# Patient Record
Sex: FEMALE | Race: WHITE | NOT HISPANIC OR LATINO | Employment: OTHER | URBAN - METROPOLITAN AREA
[De-identification: names, ages, dates, MRNs, and addresses within clinical notes are randomized per-mention and may not be internally consistent; named-entity substitution may affect disease eponyms.]

---

## 2023-10-13 PROBLEM — I10 HYPERTENSION: Status: ACTIVE | Noted: 2023-10-13

## 2023-10-13 PROBLEM — M17.11 PRIMARY OSTEOARTHRITIS OF RIGHT KNEE: Status: ACTIVE | Noted: 2023-10-13

## 2023-10-13 PROBLEM — R43.0 ANOSMIA: Status: ACTIVE | Noted: 2023-10-13

## 2023-10-13 PROBLEM — E78.5 HYPERLIPIDEMIA: Status: ACTIVE | Noted: 2023-10-13

## 2023-10-13 PROBLEM — E55.9 VITAMIN D DEFICIENCY: Status: ACTIVE | Noted: 2023-10-13

## 2023-10-13 PROBLEM — M54.42 ACUTE LEFT-SIDED LOW BACK PAIN WITH LEFT-SIDED SCIATICA: Status: ACTIVE | Noted: 2023-10-13

## 2023-10-13 PROBLEM — N95.1 MENOPAUSAL SYMPTOM: Status: ACTIVE | Noted: 2023-10-13

## 2023-10-13 PROBLEM — G57.02 PIRIFORMIS SYNDROME OF LEFT SIDE: Status: ACTIVE | Noted: 2023-10-13

## 2023-10-13 PROBLEM — M19.079 PRIMARY OSTEOARTHRITIS OF TALONAVICULAR JOINT: Status: ACTIVE | Noted: 2023-10-13

## 2023-10-13 RX ORDER — FLUTICASONE FUROATE 27.5 UG/1
2 SPRAY, METERED NASAL
COMMUNITY
Start: 2023-04-26 | End: 2023-10-26 | Stop reason: ALTCHOICE

## 2023-10-13 RX ORDER — CETIRIZINE HYDROCHLORIDE 10 MG/1
10 TABLET ORAL DAILY
COMMUNITY
Start: 2022-06-01 | End: 2023-10-26 | Stop reason: ALTCHOICE

## 2023-10-13 RX ORDER — ATORVASTATIN CALCIUM 10 MG/1
10 TABLET, FILM COATED ORAL DAILY
COMMUNITY
End: 2024-02-11

## 2023-10-13 RX ORDER — ACETAMINOPHEN 500 MG
100 TABLET ORAL DAILY
COMMUNITY
Start: 2020-06-04

## 2023-10-13 RX ORDER — HYDROCHLOROTHIAZIDE 12.5 MG/1
12.5 TABLET ORAL DAILY
COMMUNITY
End: 2024-02-11

## 2023-10-13 RX ORDER — AZELASTINE 1 MG/ML
1 SPRAY, METERED NASAL 2 TIMES DAILY
COMMUNITY
Start: 2023-03-13 | End: 2023-10-26 | Stop reason: ALTCHOICE

## 2023-10-13 RX ORDER — AMLODIPINE BESYLATE 5 MG/1
5 TABLET ORAL DAILY
COMMUNITY
End: 2024-02-11

## 2023-10-16 ENCOUNTER — EVALUATION (OUTPATIENT)
Dept: PHYSICAL THERAPY | Facility: CLINIC | Age: 78
End: 2023-10-16
Payer: MEDICARE

## 2023-10-16 DIAGNOSIS — M54.42 LUMBAGO WITH SCIATICA, LEFT SIDE: ICD-10-CM

## 2023-10-16 DIAGNOSIS — G57.02 LESION OF SCIATIC NERVE, LEFT LOWER LIMB: ICD-10-CM

## 2023-10-16 DIAGNOSIS — G57.02 PIRIFORMIS SYNDROME OF LEFT SIDE: Primary | ICD-10-CM

## 2023-10-16 PROCEDURE — 97110 THERAPEUTIC EXERCISES: CPT | Mod: GP

## 2023-10-16 PROCEDURE — 97162 PT EVAL MOD COMPLEX 30 MIN: CPT | Mod: GP

## 2023-10-16 NOTE — Clinical Note
October 16, 2023     Patient: Giovanna Flores   YOB: 1945   Date of Visit: 10/16/2023       To Whom It May Concern:    It is my medical opinion that Giovanna Flores {Work release (duty restriction):29774}.    If you have any questions or concerns, please don't hesitate to call.         Sincerely,        Alex Macias, PT    CC: No Recipients

## 2023-10-16 NOTE — Clinical Note
October 16, 2023     Patient: Giovanna Flores   YOB: 1945   Date of Visit: 10/16/2023       To Whom it May Concern:    Giovanna Flores was seen in my clinic on 10/16/2023. She {Return to school/sport:10539}.    If you have any questions or concerns, please don't hesitate to call.         Sincerely,          Alex Macias, PT        CC: No Recipients

## 2023-10-16 NOTE — PROGRESS NOTES
Physical Therapy Evaluation    Patient Name: Giovanna Flores  MRN: [id number]  Evaluation Date: 10/16/2023  Time Calculation  Start Time: 0900  Stop Time: 0950  Time Calculation (min): 50 min      Subjective   General:  Patient is a 79 yo female with 2 month history of LBP with left LE pain/paresthesia.  Patient with chronic history of LBP; however, recent increase in activity with resulting increase in symptoms.  Patient continues to exercise- decreased biking agressiveness with modest improvement.  Patient recently completed steroids with no improvement.  Patient is motivated to participate.         Pain:  6-10 LBP/left LE paresthesia to ankle  Some paresthesia right LE foot       Past Medical History  Hyperlipidemia  Hypertension  LBP with left sciatica  Rght knee OA  Hiatal hernia    Prior Function Per Pt/Caregiver Report:  Active female - exercise regularly         OBJECTIVE:  Objective   Posture:  Sway back         Palpation:  Modest tenderness LPS left > right  Special Tests:  ++ DELLA bilateral       Transfers:  Good body mechanics     Other:  Pelvic alignment WFL       Lumbar AROM  Flexion WFL  Extension 25% limited by pain left  Bilateral rotation limited 25% by pain left  Bilateral SB limitted 25% by pain left     Hip AROM  DELLA tightness left > right    Specific Lower Extremity MMT  Hip flexion left 4  Hip flexion right 4-  Hip ABDuction  4- bilateral  Quads 4- bilateral  HS 4 bilateral  DF/PF 4- bilateral        Outcome Measures:    Oswestry=23    Assessment       Pt is a 78 y.o. female who presents with impairments of pain, decressded ROM, weakness. These impairments have led to functional limitations including decreased exercise/ADL participation. Pt would benefit from skilled physical therapy intervention to improve above impairments and facilitate return to function.    Plan  1-2 x/week for 10 visits  Exercise  Manual/DN  US  Pelvic traction       OP EDUCATION:  Patient educated on avoiding standing  on exercise bike    Today's Treatment:  HEP to be completed daily, exercises include:  Hip abductor stretch  Piriformis stretch  clamshells    Goals:  STG  Patient to be independent with HEP    LTG-  Patient to walk for 30 minutes without need to sit  2.   Patient to exercise without pain  3.   Patient to improve Oswestry to <25% impaired

## 2023-10-19 ENCOUNTER — APPOINTMENT (OUTPATIENT)
Dept: PHYSICAL THERAPY | Facility: CLINIC | Age: 78
End: 2023-10-19
Payer: MEDICARE

## 2023-10-25 ENCOUNTER — TREATMENT (OUTPATIENT)
Dept: PHYSICAL THERAPY | Facility: CLINIC | Age: 78
End: 2023-10-25
Payer: MEDICARE

## 2023-10-25 DIAGNOSIS — G57.02 PIRIFORMIS SYNDROME OF LEFT SIDE: Primary | ICD-10-CM

## 2023-10-25 PROCEDURE — 97110 THERAPEUTIC EXERCISES: CPT | Mod: GP

## 2023-10-25 NOTE — PROGRESS NOTES
"Physical Therapy Treatment    Patient Name: Giovanna Flores  MRN: [id number]  Encounter date:  10/25/2023  Time Calculation  Start Time: 0800  Stop Time: 0845  Time Calculation (min): 45 min    Visit Number:  2 / 10   Visit Authorized:  MN    Current Problem  1. Piriformis syndrome of left side  Follow Up In Physical Therapy          Precautions  None    Pain  7/10 going down LLE    Subjective  General  Pt has been consistent with HEP, gets some temporary relief but overall pain intensity remains high.     Objective  Tighter L>R hamstrings, piriformis      Treatment:  Therex:  NuStep L1 x 5'  Supine:  90/90 hamstring stretch 3x30\" ea  Sciatic nerve glides x20  Figure 4 piriformis stretch push/pull 3x30\"ea    With red ball:  DKTC x15 5\"  LTR x10  DL bridge 10    TrA brace x10 3\" hold  + iso hip adduction x10  + march x10    Billed Treatment Times:  Therapeutic Exercise 42 min      Assessment:  Pt's response to treatment:  Today's session focused on therex to address flexibility and stability deficits. Pt with some relief in pain following, required minimal cueing for proper exercise technique.      Pain end of session:  2/10    Plan:  Continue with current POC/no changes    Assessment of current progress against goals:  Insufficient treatment time to assess progress      Goals:  STG  Patient to be independent with HEP    LTG-  Patient to walk for 30 minutes without need to sit  2.   Patient to exercise without pain  3.   Patient to improve Oswestry to <25% impaired              "

## 2023-10-26 ENCOUNTER — OFFICE VISIT (OUTPATIENT)
Dept: PRIMARY CARE | Facility: CLINIC | Age: 78
End: 2023-10-26
Payer: MEDICARE

## 2023-10-26 ENCOUNTER — LAB (OUTPATIENT)
Dept: LAB | Facility: LAB | Age: 78
End: 2023-10-26
Payer: MEDICARE

## 2023-10-26 VITALS
SYSTOLIC BLOOD PRESSURE: 126 MMHG | WEIGHT: 134 LBS | BODY MASS INDEX: 22.88 KG/M2 | DIASTOLIC BLOOD PRESSURE: 64 MMHG | HEART RATE: 81 BPM | OXYGEN SATURATION: 98 % | HEIGHT: 64 IN

## 2023-10-26 DIAGNOSIS — R22.1 NECK MASS: ICD-10-CM

## 2023-10-26 DIAGNOSIS — R22.1 NECK MASS: Primary | ICD-10-CM

## 2023-10-26 LAB
ALBUMIN SERPL-MCNC: 4.5 G/DL (ref 3.5–5)
ALP BLD-CCNC: 102 U/L (ref 35–125)
ALT SERPL-CCNC: 15 U/L (ref 5–40)
ANION GAP SERPL CALC-SCNC: 14 MMOL/L
AST SERPL-CCNC: 23 U/L (ref 5–40)
BASOPHILS # BLD AUTO: 0.06 X10*3/UL (ref 0–0.1)
BASOPHILS NFR BLD AUTO: 0.9 %
BILIRUB SERPL-MCNC: 0.4 MG/DL (ref 0.1–1.2)
BUN SERPL-MCNC: 22 MG/DL (ref 8–25)
CALCIUM SERPL-MCNC: 9.5 MG/DL (ref 8.5–10.4)
CHLORIDE SERPL-SCNC: 103 MMOL/L (ref 97–107)
CO2 SERPL-SCNC: 27 MMOL/L (ref 24–31)
CREAT SERPL-MCNC: 0.8 MG/DL (ref 0.4–1.6)
EOSINOPHIL # BLD AUTO: 0.22 X10*3/UL (ref 0–0.4)
EOSINOPHIL NFR BLD AUTO: 3.5 %
ERYTHROCYTE [DISTWIDTH] IN BLOOD BY AUTOMATED COUNT: 13.3 % (ref 11.5–14.5)
GFR SERPL CREATININE-BSD FRML MDRD: 76 ML/MIN/1.73M*2
GLUCOSE SERPL-MCNC: 102 MG/DL (ref 65–99)
HCT VFR BLD AUTO: 41.4 % (ref 36–46)
HGB BLD-MCNC: 12.9 G/DL (ref 12–16)
IMM GRANULOCYTES # BLD AUTO: 0.01 X10*3/UL (ref 0–0.5)
IMM GRANULOCYTES NFR BLD AUTO: 0.2 % (ref 0–0.9)
LYMPHOCYTES # BLD AUTO: 1.64 X10*3/UL (ref 0.8–3)
LYMPHOCYTES NFR BLD AUTO: 25.8 %
MCH RBC QN AUTO: 28.4 PG (ref 26–34)
MCHC RBC AUTO-ENTMCNC: 31.2 G/DL (ref 32–36)
MCV RBC AUTO: 91 FL (ref 80–100)
MONOCYTES # BLD AUTO: 0.6 X10*3/UL (ref 0.05–0.8)
MONOCYTES NFR BLD AUTO: 9.4 %
NEUTROPHILS # BLD AUTO: 3.83 X10*3/UL (ref 1.6–5.5)
NEUTROPHILS NFR BLD AUTO: 60.2 %
NRBC BLD-RTO: 0 /100 WBCS (ref 0–0)
PLATELET # BLD AUTO: 456 X10*3/UL (ref 150–450)
PMV BLD AUTO: 10.9 FL (ref 7.5–11.5)
POTASSIUM SERPL-SCNC: 4.2 MMOL/L (ref 3.4–5.1)
PROT SERPL-MCNC: 6.5 G/DL (ref 5.9–7.9)
RBC # BLD AUTO: 4.55 X10*6/UL (ref 4–5.2)
SODIUM SERPL-SCNC: 144 MMOL/L (ref 133–145)
WBC # BLD AUTO: 6.4 X10*3/UL (ref 4.4–11.3)

## 2023-10-26 PROCEDURE — 3074F SYST BP LT 130 MM HG: CPT

## 2023-10-26 PROCEDURE — 99213 OFFICE O/P EST LOW 20 MIN: CPT

## 2023-10-26 PROCEDURE — 36415 COLL VENOUS BLD VENIPUNCTURE: CPT

## 2023-10-26 PROCEDURE — 1159F MED LIST DOCD IN RCRD: CPT

## 2023-10-26 PROCEDURE — 85025 COMPLETE CBC W/AUTO DIFF WBC: CPT

## 2023-10-26 PROCEDURE — 1126F AMNT PAIN NOTED NONE PRSNT: CPT

## 2023-10-26 PROCEDURE — 80053 COMPREHEN METABOLIC PANEL: CPT

## 2023-10-26 PROCEDURE — 3078F DIAST BP <80 MM HG: CPT

## 2023-10-26 PROCEDURE — 1036F TOBACCO NON-USER: CPT

## 2023-10-26 ASSESSMENT — ENCOUNTER SYMPTOMS
FATIGUE: 0
CHILLS: 0
FEVER: 0
UNEXPECTED WEIGHT CHANGE: 0
CHOKING: 0

## 2023-10-26 ASSESSMENT — PATIENT HEALTH QUESTIONNAIRE - PHQ9
SUM OF ALL RESPONSES TO PHQ9 QUESTIONS 1 AND 2: 0
2. FEELING DOWN, DEPRESSED OR HOPELESS: NOT AT ALL
1. LITTLE INTEREST OR PLEASURE IN DOING THINGS: NOT AT ALL

## 2023-10-26 ASSESSMENT — PAIN SCALES - GENERAL: PAINLEVEL: 0-NO PAIN

## 2023-10-26 NOTE — PROGRESS NOTES
"Subjective   Patient ID: Giovanna Flores is a 78 y.o. female who presents for Mass (Pt c/o lump on neck, found by her dentist).    HPI   Neck lump x 3 days, noticed by dentist.   Patient states just had Covid and flu shot one week before neck lump and thinks may be enlarged LN. Has never felt lump before, patient states feel fine. Located right lateral side. Endorses father had lymphoma. Denies fever, chills, night sweats, dysphagia.     Review of Systems   Constitutional:  Negative for chills, fatigue, fever and unexpected weight change.   Respiratory:  Negative for choking.        Objective   /64   Pulse 81   Ht 1.626 m (5' 4\")   Wt 60.8 kg (134 lb)   SpO2 98%   BMI 23.00 kg/m²     Physical Exam  Constitutional:       Appearance: Normal appearance.   Neck:        Comments: 3.5 by 1.5 oblong lateral right neck mass, firm and unmovable to palpation, non-tender.   Cardiovascular:      Rate and Rhythm: Normal rate.   Pulmonary:      Breath sounds: Normal breath sounds.         Assessment/Plan   Diagnoses and all orders for this visit:  Neck mass  -     US neck; Future  -     CBC and Auto Differential; Future  -     Comprehensive metabolic panel; Future         "

## 2023-10-27 ENCOUNTER — TELEPHONE (OUTPATIENT)
Dept: PRIMARY CARE | Facility: CLINIC | Age: 78
End: 2023-10-27

## 2023-10-27 ENCOUNTER — HOSPITAL ENCOUNTER (OUTPATIENT)
Dept: RADIOLOGY | Facility: HOSPITAL | Age: 78
Discharge: HOME | End: 2023-10-27
Payer: MEDICARE

## 2023-10-27 ENCOUNTER — TREATMENT (OUTPATIENT)
Dept: PHYSICAL THERAPY | Facility: CLINIC | Age: 78
End: 2023-10-27
Payer: MEDICARE

## 2023-10-27 DIAGNOSIS — R22.1 NECK MASS: ICD-10-CM

## 2023-10-27 DIAGNOSIS — G57.02 PIRIFORMIS SYNDROME OF LEFT SIDE: ICD-10-CM

## 2023-10-27 DIAGNOSIS — R22.1 NECK MASS: Primary | ICD-10-CM

## 2023-10-27 PROCEDURE — 97140 MANUAL THERAPY 1/> REGIONS: CPT | Mod: GP

## 2023-10-27 PROCEDURE — 97110 THERAPEUTIC EXERCISES: CPT | Mod: GP

## 2023-10-27 PROCEDURE — 76536 US EXAM OF HEAD AND NECK: CPT

## 2023-10-27 NOTE — PROGRESS NOTES
"Physical Therapy Treatment    Patient Name: Giovanna Flores  MRN: [id number]  Encounter date:  10/27/2023  Time Calculation  Start Time: 0755  Stop Time: 0840  Time Calculation (min): 45 min    Visit Number:  3 / 10   Visit Authorized:  MN    Current Problem  1. Piriformis syndrome of left side  Follow Up In Physical Therapy            Precautions  None    Pain  9/10    Subjective  General  Pt had about an hour of relief after last visit however symptoms have come back to high levels. Pain goes down the lateral aspect of LLE.     Objective  TTP L PSIS      Treatment:  Therex:  NuStep L1 x 5'  Supine:  90/90 hamstring stretch 3x30\" ea  Figure 4 piriformis stretch push/pull 3x30\"ea  DL bridge x10 with march - no pain initially but pain increased towards end of set  Bridge with adduction x10 - mild discomfort  SKTC 10x10\"    Manual techniques:  STM over L lumbar paraspinals, around L PSIS, L proximal gltues    Billed Treatment Times:  Therapeutic Exercise 30 min and Manual Therapy  15 min      Assessment:  Pt with less pain following exercise despite temporary increase in symptoms with bridging. STM used to improve lasting symptom relief. Pain down at exit.      Pain end of session:  4/10    Plan:  Continue with current POC/no changes    Assessment of current progress against goals:  Insufficient treatment time to assess progress      Goals:  STG  Patient to be independent with HEP    LTG-  Patient to walk for 30 minutes without need to sit  2.   Patient to exercise without pain  3.   Patient to improve Oswestry to <25% impaired            "

## 2023-10-27 NOTE — TELEPHONE ENCOUNTER
US of neck was read as negative, but patient is palpable mass so would like her to still see ENT. Also will discuss patient's case in more detail with Dr. Villa next week. Already discussed before US, and she would like patient to see ENT specialist as well.

## 2023-10-30 ENCOUNTER — TREATMENT (OUTPATIENT)
Dept: PHYSICAL THERAPY | Facility: CLINIC | Age: 78
End: 2023-10-30
Payer: MEDICARE

## 2023-10-30 DIAGNOSIS — G57.02 PIRIFORMIS SYNDROME OF LEFT SIDE: ICD-10-CM

## 2023-10-30 PROCEDURE — 97140 MANUAL THERAPY 1/> REGIONS: CPT | Mod: GP

## 2023-10-30 PROCEDURE — 97110 THERAPEUTIC EXERCISES: CPT | Mod: GP

## 2023-10-30 NOTE — PROGRESS NOTES
"Physical Therapy Treatment    Patient Name: Giovanna Flores  MRN: [id number]  Encounter date:  10/30/2023  Time Calculation  Start Time: 0845  Stop Time: 0930  Time Calculation (min): 45 min    Visit Number:  4 / 10   Visit Authorized:  MN    Current Problem  1. Piriformis syndrome of left side  Follow Up In Physical Therapy                 Pain  Up to 5     Subjective  General  Patient reports variable level of left hip/LE pain and paresthesia over weekend.  Patient reports fairly heavy activity over weekend.        Objective  Modes tenderness left buttock/piriformis    Treatment:  There-EX  Sci Fit L3 x 7 min  Supine:  90/90 hamstring stretch 3x30\" ea- with assist  Figure 4 piriformis stretch push/pull 3x30\"ea  DL bridge x10 with march - no pain initially but pain increased towards end of set-  DNP  Bridge with adduction x10 - mild discomfort- DNP  SKTC 10x10\"  Hooklying ABD GTB x 20  Ball flexion x 20  Bal bridge 2 x 10  Manual techniques:  STM over L lumbar paraspinals, around L PSIS, L proximal glutes- sidelying right      Billed Treatment Times:  Therapeutic Exercise 30 min  Manual 10 min    Activity tolerance:  Good    OP Education  Continue HEP    Assessment:  Patient making good progress with management of piriformis syndrome through stretching.     Pt's response to treatment:  good  Areas of improvements:  flexibility  Limitations/deficits:  pain    Pain end of session:  5    Plan:     Continue with current POC with the following changes advance core stability as able.    Assessment of current progress against goals:  Progressing toward functional goals           Goals:  STG  Patient to be independent with HEP     LTG-  Patient to walk for 30 minutes without need to sit  2.   Patient to exercise without pain  3.   Patient to improve Oswestry to <25% impaired               "

## 2023-11-01 ENCOUNTER — TELEPHONE (OUTPATIENT)
Dept: PRIMARY CARE | Facility: CLINIC | Age: 78
End: 2023-11-01

## 2023-11-01 ENCOUNTER — TREATMENT (OUTPATIENT)
Dept: PHYSICAL THERAPY | Facility: CLINIC | Age: 78
End: 2023-11-01
Payer: MEDICARE

## 2023-11-01 DIAGNOSIS — G57.02 PIRIFORMIS SYNDROME OF LEFT SIDE: ICD-10-CM

## 2023-11-01 DIAGNOSIS — R22.1 NECK MASS: Primary | ICD-10-CM

## 2023-11-01 PROCEDURE — 97140 MANUAL THERAPY 1/> REGIONS: CPT | Mod: GP

## 2023-11-01 PROCEDURE — 97110 THERAPEUTIC EXERCISES: CPT | Mod: GP

## 2023-11-01 NOTE — TELEPHONE ENCOUNTER
Discussed case in further detail with Dr. Villa, depending on when appointment with Zora Mercado (ENT) is may order CT of neck. If appointment is in the next few weeks can wait, but if appt is more than a month out will order additional imaging.

## 2023-11-01 NOTE — PROGRESS NOTES
"Physical Therapy Treatment    Patient Name: Giovanna Flores  MRN: [id number]  Encounter date:  11/1/2023  Time Calculation  Start Time: 0814  Stop Time: 0910  Time Calculation (min): 56 min    Visit Number:  5 / 10   Visit Authorized:  MN    Current Problem  1. Piriformis syndrome of left side  Follow Up In Physical Therapy          Pain  3/10    Subjective  General  Pt had a day of relief after last visit but symptoms returned at night. Pain is down this morning. She feels her overall pain levels are decreasing, and manual interventions have been beneficial.    Objective  Mild core weakness evident in quadruped    Treatment:  There-EX  NuStep L1 x 5'  Supine:  90/90 hamstring stretch 2x30\" ea  Figure 4 piriformis stretch push/pull 2x30\"ea  Quadruped:  Hip ext x10 ea  Hip abd x10 ea  Shoulder extension x10 ea    Seated on tori disc:  March in place x15  LAQ x15  Chest press green ball x15  OH press green ball x15    Standing:  Pallof press x10 ea 10# band  Stir the pot x10 ea cw, ccw 10# band  Pallof walkout x10 ea 10# band    Manual techniques:  STM over L lumbar paraspinals, around L PSIS, L proximal glutes- prone      Billed Treatment Times:  Therapeutic Exercise 43 min and Manual Therapy  10 min      Assessment:  Pt's response to treatment:  Exercise progressed in mat level, seated, and standing. Pt felt some discomfort in quadruped but otherwise no pain. Moderate cueing required for form and core engagement with standing exercise.   Areas of improvements:  good pain relief from manual, no increase in symptoms with new exercise  Limitations/deficits:  pain continues to return 1-2 days post treatment    Pain end of session:  0/10    Plan:    Continue with current POC with the following changes follow up on new exercises and issue for HEP if appropriate.    Assessment of current progress against goals:  Progressing toward functional goals        Goals:  STG  Patient to be independent with HEP     LTG-  Patient to " walk for 30 minutes without need to sit  2.   Patient to exercise without pain  3.   Patient to improve Oswestry to <25% impaired

## 2023-11-06 ENCOUNTER — TREATMENT (OUTPATIENT)
Dept: PHYSICAL THERAPY | Facility: CLINIC | Age: 78
End: 2023-11-06
Payer: MEDICARE

## 2023-11-06 DIAGNOSIS — G57.02 PIRIFORMIS SYNDROME OF LEFT SIDE: ICD-10-CM

## 2023-11-06 PROCEDURE — 97110 THERAPEUTIC EXERCISES: CPT | Mod: GP

## 2023-11-06 PROCEDURE — 97140 MANUAL THERAPY 1/> REGIONS: CPT | Mod: GP

## 2023-11-06 NOTE — PROGRESS NOTES
"Physical Therapy Treatment    Patient Name: Giovanna Flores  MRN: [id number]  Encounter date:  11/6/2023  Time Calculation  Start Time: 0800  Stop Time: 0845  Time Calculation (min): 45 min    Visit Number:  6 / 10   Visit Authorized:  10    Current Problem  1. Piriformis syndrome of left side  Follow Up In Physical Therapy            Pain  Up to 3       Subjective  General  Patient reports marked improvement with in left hip pain- some left LE (distal) paresthesia remains.  Patient dilignent with HEP.           Treatment:  There-Ex:    NuStep L1 x 5'  Supine:  90/90 hamstring stretch 2x30\" ea  Figure 4 piriformis stretch push/pull 2x30\"ea    Quadruped:  Hip ext x 5 ea  Hip abd x 5 ea- pain  Shoulder extension x10 ea     Seated on ball:issued to HEP/recommend sit on pillows if ball unstable  March in place x 15  Overhead press 5# x 15  LAQ x 15    Standing:  Pallof press x10 ea 10# band  Stir the pot x10 ea cw, ccw 10# band  Pallof walkout x10 ea 10# band- DNP     Manual:  STM over L lumbar paraspinals, around L PSIS, L proximal glutes- prone     Billed Treatment Times:  Therapeutic Exercise 30 min  Manual 10    Activity tolerance:  good       Assessment:     Pt's response to treatment:  good  Areas of improvements:  core strength  Limitations/deficits:  left LE paresthesia    Pain end of session:  2-3    Plan:     Continue with current POC with the following changes advance as able    Assessment of current progress against goals:  Progressing toward functional goals    OP Education:       Goals:  STG  Patient to be independent with HEP     LTG-  Patient to walk for 30 minutes without need to sit  2.   Patient to exercise without pain  3.   Patient to improve Oswestry to <25% impaired                  "

## 2023-11-08 ENCOUNTER — TREATMENT (OUTPATIENT)
Dept: PHYSICAL THERAPY | Facility: CLINIC | Age: 78
End: 2023-11-08
Payer: MEDICARE

## 2023-11-08 DIAGNOSIS — G57.02 PIRIFORMIS SYNDROME OF LEFT SIDE: ICD-10-CM

## 2023-11-08 PROCEDURE — 97110 THERAPEUTIC EXERCISES: CPT | Mod: GP

## 2023-11-08 PROCEDURE — 97140 MANUAL THERAPY 1/> REGIONS: CPT | Mod: GP

## 2023-11-08 NOTE — PROGRESS NOTES
"Physical Therapy Treatment    Patient Name: Giovanna Flores  MRN: [id number]  Encounter date:  11/8/2023  Time Calculation  Start Time: 0815  Stop Time: 0855  Time Calculation (min): 40 min    Visit Number:  7/ 10   Visit Authorized:  10    Current Problem  1. Piriformis syndrome of left side  Follow Up In Physical Therapy              Pain  1-2/10    Subjective  General  Pt still gets symptoms in the mornings however the intensity is greatly reduced. Pain lessens with gentle mobility. Exercise/manual at PT have been beneficial.    Obective:  Mild core weakness in quadruped    Treatment:  Ther-Ex:  NuStep L1 x 5'  Supine:  PPT x10 3\" hold  90/90 heel tap x10 - mod cues for form    Quadruped:  Hip ext x 10 - mild discomfort when stabilizing on LLE  Shoulder extension x10 ea    Standing:  3-way hip x10 orange band above knee - nerve discomfort when kicking LLE so performed only standing on LLE   Band sidestep x10 R/L orange above knees  Mini squat x10 - cues for form      Manual:  STM over L lumbar paraspinals, around L PSIS, L proximal glutes- prone     Billed Treatment Times:  Therapeutic Exercise 30 min and Manual Therapy  10 min      Assessment:  Pt's response to treatment:  Exercise progressed as above. Pt had intermittent symptoms down LLE but this did not linger. Pt required moderate cues for form with more advanced exercises.   Areas of improvements:  reduced pain levels throughout the day  Limitations/deficits:  intermittent nerve pain with exercise    Pain end of session:  2-3    Plan:  Continue with current POC/no changes    Assessment of current progress against goals:  Progressing toward functional goals      Goals:  STG  Patient to be independent with HEP     LTG-  Patient to walk for 30 minutes without need to sit  2.   Patient to exercise without pain  3.   Patient to improve Oswestry to <25% impaired                  "

## 2023-11-10 NOTE — PROGRESS NOTES
"Physical Therapy Treatment    Patient Name: Giovanna Flores  MRN: [id number]  Encounter date:  11/13/2023  Time Calculation  Start Time: 0800  Stop Time: 0845  Time Calculation (min): 45 min    Visit Number:  7 / 10   Visit Authorized:  10    Current Problem  1. Piriformis syndrome of left side  Follow Up In Physical Therapy           Pain  2-3     Subjective  General  Patient reports increased walking without recourse.     Objective  Excellent body mechanics supine to sit    Treatment:    NuStep L2 x 7'  Supine:  90/90 hamstring stretch 2x30\" ea  Figure 4 piriformis stretch push/pull 2x30\"ea     Quadruped:  Hip ext x 5 ea  Hip abd x 5 ea- pain  Shoulder extension x10 ea     Seated on blue ball: issued to HEP/recommend sit on pillows if ball unstable  March in place x 15  Overhead press 5# x 15  LAQ x 15    Standing:  Pallof press x10 ea 10# band  Stir the pot x10 ea cw, ccw 10# band  Pallof walkout x10 ea 10# band- DNP     Manual:  STM over L lumbar paraspinals, around L PSIS, L proximal glutes- prone           Billed Treatment Times:  Therapeutic Exercise 30 min  Manual 10 min    Activity tolerance:  good      Assessment:     Pt's response to treatment:  good  Areas of improvements:  function  Limitations/deficits:  hip flexibility    Pain end of session:  2    Plan:     Continue with current POC with the following changes advancel as tolerated    Assessment of current progress against goals:  Progressing toward functional goals    OP Education:  Monitor body mechanics       Goals:  STG  Patient to be independent with HEP     LTG-  Patient to walk for 30 minutes without need to sit  2.   Patient to exercise without pain  3.   Patient to improve Oswestry to <25% impaired               STG  Patient to be independent with HEP     LTG-  Patient to walk for 30 minutes without need to sit  2.   Patient to exercise without pain  3.   Patient to improve Oswestry to <25% impaired                  "

## 2023-11-13 ENCOUNTER — TREATMENT (OUTPATIENT)
Dept: PHYSICAL THERAPY | Facility: CLINIC | Age: 78
End: 2023-11-13
Payer: MEDICARE

## 2023-11-13 DIAGNOSIS — G57.02 PIRIFORMIS SYNDROME OF LEFT SIDE: ICD-10-CM

## 2023-11-13 PROCEDURE — 97110 THERAPEUTIC EXERCISES: CPT | Mod: GP

## 2023-11-13 PROCEDURE — 97140 MANUAL THERAPY 1/> REGIONS: CPT | Mod: GP

## 2023-11-17 ENCOUNTER — ANCILLARY PROCEDURE (OUTPATIENT)
Dept: RADIOLOGY | Facility: CLINIC | Age: 78
End: 2023-11-17
Payer: MEDICARE

## 2023-11-17 ENCOUNTER — TELEPHONE (OUTPATIENT)
Dept: PRIMARY CARE | Facility: CLINIC | Age: 78
End: 2023-11-17
Payer: MEDICARE

## 2023-11-17 DIAGNOSIS — E04.9 THYROID GOITER: Primary | ICD-10-CM

## 2023-11-17 DIAGNOSIS — E04.1 NODULE OF LEFT LOBE OF THYROID GLAND: ICD-10-CM

## 2023-11-17 DIAGNOSIS — R22.1 NECK MASS: Primary | ICD-10-CM

## 2023-11-17 PROCEDURE — 70491 CT SOFT TISSUE NECK W/DYE: CPT

## 2023-11-17 PROCEDURE — 2550000001 HC RX 255 CONTRASTS

## 2023-11-17 RX ADMIN — IOHEXOL 50 ML: 350 INJECTION, SOLUTION INTRAVENOUS at 09:09

## 2023-11-20 NOTE — TELEPHONE ENCOUNTER
Overall reading of CT was nonspecific, but radiologist recommended referral to ENT twice. She had a component of asymmetric mucosal thickening in the back of her nose that is recommended to see ENT. Also has thyroid goiter with nodule on left lobe of thyroid.       Sent Dr. Blakely CT results to see if patient needed earlier appointment, Dr. Blakely said is go to wait until January appointment but asked for additional US order of thyroid to be done before appt. Order placed.

## 2023-11-29 ENCOUNTER — TREATMENT (OUTPATIENT)
Dept: PHYSICAL THERAPY | Facility: CLINIC | Age: 78
End: 2023-11-29
Payer: MEDICARE

## 2023-11-29 DIAGNOSIS — G57.02 PIRIFORMIS SYNDROME OF LEFT SIDE: ICD-10-CM

## 2023-11-29 PROCEDURE — 97140 MANUAL THERAPY 1/> REGIONS: CPT | Mod: GP

## 2023-11-29 PROCEDURE — 97110 THERAPEUTIC EXERCISES: CPT | Mod: GP

## 2023-11-29 NOTE — PROGRESS NOTES
"Physical Therapy Treatment    Patient Name: Giovanna Flores  MRN: [id number]  Encounter date:  11/29/2023  Time Calculation  Start Time: 0815  Stop Time: 0900  Time Calculation (min): 45 min    Visit Number:  8 / 10   Visit Authorized:  10    Current Problem  1. Piriformis syndrome of left side  Follow Up In Physical Therapy        Pain  5/10    Subjective  General  Pt reports somewhat higher pain last week as she was on her feet cooking for the holiday. She also reports a fall that occurred when tripping over a rug. She has had some shoulder soreness since but has not seen her doctor - symptoms are gradually getting better.     Objective  TTP around L PSIS    Treatment:  NuStep L2 x 6'  Supine:  90/90 hamstring stretch 2x30\" ea  Figure 4 piriformis stretch push/pull 2x30\"ea  SKTC 2x30\" Ea       Seated on dynadisc:  March in place 2x15  Overhead press purple ball 2x10  LAQ  2x10    Standing:  MoFlex calf stretch 3x30\"       Manual:  STM over L lumbar paraspinals, around L PSIS, L proximal glutes- prone           Billed Treatment Times:  Therapeutic Exercise 30 min  Manual 10 min      Assessment:  Pt's response to treatment:  Pt with less soreness and discomfort following manual techniques and light exercise. Pt not limited by shoulder soreness and was advised to seek medical attention if symptoms continue or worsen.   Areas of improvements:  good pain response within session  Limitations/deficits: continued pain with activity at home    Pain end of session:  1/10    Plan:  Continue with current POC/no changes    Assessment of current progress against goals:  Progressing toward functional goals      Goals:  STG  Patient to be independent with HEP     LTG-  Patient to walk for 30 minutes without need to sit  2.   Patient to exercise without pain  3.   Patient to improve Oswestry to <25% impaired           "

## 2023-12-01 ENCOUNTER — APPOINTMENT (OUTPATIENT)
Dept: PHYSICAL THERAPY | Facility: CLINIC | Age: 78
End: 2023-12-01
Payer: MEDICARE

## 2023-12-04 ENCOUNTER — ANCILLARY PROCEDURE (OUTPATIENT)
Dept: RADIOLOGY | Facility: CLINIC | Age: 78
End: 2023-12-04
Payer: MEDICARE

## 2023-12-04 VITALS — WEIGHT: 140 LBS | BODY MASS INDEX: 23.9 KG/M2 | HEIGHT: 64 IN

## 2023-12-04 DIAGNOSIS — R92.8 ABNORMAL MAMMOGRAM OF RIGHT BREAST: Primary | ICD-10-CM

## 2023-12-04 DIAGNOSIS — Z12.31 ENCOUNTER FOR SCREENING MAMMOGRAM FOR MALIGNANT NEOPLASM OF BREAST: ICD-10-CM

## 2023-12-04 DIAGNOSIS — E04.1 NODULE OF LEFT LOBE OF THYROID GLAND: ICD-10-CM

## 2023-12-04 DIAGNOSIS — E04.9 THYROID GOITER: ICD-10-CM

## 2023-12-04 PROCEDURE — 77067 SCR MAMMO BI INCL CAD: CPT

## 2023-12-04 PROCEDURE — 76536 US EXAM OF HEAD AND NECK: CPT

## 2023-12-04 NOTE — PROGRESS NOTES
"Physical Therapy Treatment    Patient Name: Giovanna Flores  MRN: [id number]  Encounter date:  12/5/2023  Time Calculation  Start Time: 0810  Stop Time: 0855  Time Calculation (min): 45 min    Visit Number:  9 / 10   Visit Authorized:  10    Current Problem  1. Piriformis syndrome of left side  Follow Up In Physical Therapy              Precautions       Pain  5/10      Subjective  General  Patient reports overall decrease in intensity and frequency of left sided SI type pain in addition to decrease in intensity and frequency of left LE paresthesia.  Patient diligent with HEP         Objective  ++ tenderness left buttock        Treatment:  NuStep L2 x 8'  Supine:  90/90 hamstring stretch 2x30\" ea  Figure 4 piriformis stretch push/pull 2x30\"ea  SKTC 2x30\" Ea  Ball flexion x 20     Seated on dynadisc:  March in place 2x10  Overhead press purple ball 2x10- DNP  LAQ  2x10    Standing:  MoFlex calf stretch 3x30\"- DNP     Stir the pot x 20 ea 10#     Manual:  STM over L lumbar paraspinals, around L PSIS, L proximal glutes- prone           Billed Treatment Times:  Therapeutic Exercise 35 min and Manual Therapy  10 min    Activity tolerance:  good    Assessment:   Pt's response to treatment:  good  Areas of improvements:  radicular pain  Limitations/deficits:  AM soreness    Pain end of session:  3    Plan:     Continue with current POC with the following changes re-assess    Assessment of current progress against goals:  Progressing toward functional goals    Goals:      STG  Patient to be independent with HEP     LTG-  Patient to walk for 30 minutes without need to sit  2.   Patient to exercise without pain  3.   Patient to improve Oswestry to <25% impaired              "

## 2023-12-05 ENCOUNTER — TREATMENT (OUTPATIENT)
Dept: PHYSICAL THERAPY | Facility: CLINIC | Age: 78
End: 2023-12-05
Payer: MEDICARE

## 2023-12-05 ENCOUNTER — TELEPHONE (OUTPATIENT)
Dept: PRIMARY CARE | Facility: CLINIC | Age: 78
End: 2023-12-05

## 2023-12-05 DIAGNOSIS — G57.02 PIRIFORMIS SYNDROME OF LEFT SIDE: Primary | ICD-10-CM

## 2023-12-05 PROCEDURE — 97110 THERAPEUTIC EXERCISES: CPT | Mod: GP

## 2023-12-05 PROCEDURE — 97140 MANUAL THERAPY 1/> REGIONS: CPT | Mod: GP

## 2023-12-05 NOTE — TELEPHONE ENCOUNTER
Please let the patient know I have again updated both [facility] Mercado and Dr. Villa regarding the positive thyroid nodule on US exam. Dr. Blakely does biopsies in his office, and will discuss the biopsy at your appointment on 1/11/23.

## 2023-12-07 ENCOUNTER — HOSPITAL ENCOUNTER (OUTPATIENT)
Dept: RADIOLOGY | Facility: HOSPITAL | Age: 78
Discharge: HOME | End: 2023-12-07
Payer: MEDICARE

## 2023-12-07 DIAGNOSIS — R92.8 OTHER ABNORMAL AND INCONCLUSIVE FINDINGS ON DIAGNOSTIC IMAGING OF BREAST: ICD-10-CM

## 2023-12-07 PROCEDURE — 76642 ULTRASOUND BREAST LIMITED: CPT | Mod: RT

## 2023-12-07 PROCEDURE — 76982 USE 1ST TARGET LESION: CPT | Mod: RT

## 2023-12-08 DIAGNOSIS — N60.01 BREAST CYST, RIGHT: Primary | ICD-10-CM

## 2023-12-12 NOTE — PROGRESS NOTES
"Physical Therapy Treatment/Re-Assessment    Patient Name: Giovanna Flores  MRN: [id number]  Encounter date:  12/13/2023  Time Calculation  Start Time: 1030  Stop Time: 1115  Time Calculation (min): 45 min    Visit Number:  10 / 10   Visit Authorized:  10    Current Problem  1. Piriformis syndrome of left side  Follow Up In Physical Therapy          Precautions  none    Pain  3-10 (intense pain frequency decreasing    Subjective  General  Patient reports early AM stiffness/PM stiffness which is reduce with stretching- marked reduction in left LE radicular pain.  Patient continues to report right shoulder pain    Oswestry: 14 points/28% impaired- versus 23 at evaluation       Objective  decreased      Treatment:  NuStep L2 x 8'  Supine:  90/90 hamstring stretch 2x30\" ea  Figure 4 piriformis stretch push/pull 2x30\"ea  SKTC 2x30\" Ea  Ball flexion x 20     Seated on dynadisc:  March in place 2x10  Overhead press purple ball 2x10- DNP  LAQ  2x10    Standing:  MoFlex calf stretch 3x30\"- DNP     Stir the pot x 20 ea 10# crossover symetry cords     Manual:  STM over L lumbar paraspinals, around L PSIS, L proximal glutes- prone       Billed Treatment Times:  Therapeutic Exercise 30 min and Manual Therapy  10 min    Activity tolerance:  good    Assessment:  Patient with steady progress with above POC to reduce pain improve mobility/maximize core strength. Patient with modest delay in improvement after fall 11/23.  Patient will benefit from continued PT   Pt's response to treatment:  good  Areas of improvements:  core strength  Limitations/deficits:  LBP    Pain end of session:  3    Plan:     Continue with current POC with the following changes extend plan of care up to 10 additional visits.    Assessment of current progress against goals:  Progressing toward functional goals        Goals:       STG  Patient to be independent with HEP- MET     LTG-  Patient to walk for 30 minutes without need to sit-MET  2.   Patient to exercise " without pain- NOT MET  3.   Patient to improve Oswestry to <25% impaired- NOT MET

## 2023-12-13 ENCOUNTER — TREATMENT (OUTPATIENT)
Dept: PHYSICAL THERAPY | Facility: CLINIC | Age: 78
End: 2023-12-13
Payer: MEDICARE

## 2023-12-13 DIAGNOSIS — G57.02 PIRIFORMIS SYNDROME OF LEFT SIDE: Primary | ICD-10-CM

## 2023-12-13 PROCEDURE — 97140 MANUAL THERAPY 1/> REGIONS: CPT | Mod: GP

## 2023-12-13 PROCEDURE — 97110 THERAPEUTIC EXERCISES: CPT | Mod: GP

## 2024-01-05 NOTE — PROGRESS NOTES
"Physical Therapy Treatment    Patient Name: Giovanna Flores  MRN: [id number]  Encounter date:  1/8/2024  Time Calculation  Start Time: 0945  Stop Time: 1030  Time Calculation (min): 45 min     PT Therapeutic Procedures Time Entry  Manual Therapy Time Entry: 10  Therapeutic Exercise Time Entry: 30    Visit Number:  12 (including evaluation)  Planned total visits: 20  Visit Authorized:  20    Current Problem  1. Piriformis syndrome of left side  Follow Up In Physical Therapy            Precautions  none    Pain  4/10    Subjective  General  Patient reports was doing \"great\"; modest increase in symptoms after putting Jeff decorations away.    Objective  Good body mechancs      Treatment:  NuStep L2 x 6'  Supine:  90/90 hamstring stretch 2x30\" ea  Figure 4 piriformis stretch push/pull 2x30\"ea  SKTC 2x30\" Ea  Ball flexion x 20     Seated on dynadisc:  March in place 2x10  Overhead press purple ball 2x10- DNP  LAQ  2x10    Standing:  MoFlex calf stretch 3x30\"- DNP     Stir the pot x 20 ea 10# crossover symetry cords     Manual:  STM over L lumbar paraspinals, around L PSIS, L proximal glutes- prone             Billed Treatment Times:  Therapeutic Exercise 30 min and Manual Therapy  10 min    Activity tolerance:  good    OP EDUCATION:  Observe lifting technique    Assessment:     Pt's response to treatment:  good  Areas of improvements:  core  Limitations/deficits:  pain    Pain end of session:  2    Plan:     Continue with current POC with the following changes advance as ablw    Assessment of current progress against goals:  Progressing toward functional goals    Goals:    STG  Patient to be independent with HEP- MET     LTG-  Patient to walk for 30 minutes without need to sit-MET  2.   Patient to exercise without pain- NOT MET  3.   Patient to improve Oswestry to <25% impaired- NOT MET                 "

## 2024-01-08 ENCOUNTER — TREATMENT (OUTPATIENT)
Dept: PHYSICAL THERAPY | Facility: CLINIC | Age: 79
End: 2024-01-08
Payer: MEDICARE

## 2024-01-08 DIAGNOSIS — G57.02 PIRIFORMIS SYNDROME OF LEFT SIDE: Primary | ICD-10-CM

## 2024-01-08 PROCEDURE — 97110 THERAPEUTIC EXERCISES: CPT | Mod: GP

## 2024-01-08 PROCEDURE — 97140 MANUAL THERAPY 1/> REGIONS: CPT | Mod: GP

## 2024-01-11 ENCOUNTER — OFFICE VISIT (OUTPATIENT)
Dept: OTOLARYNGOLOGY | Facility: CLINIC | Age: 79
End: 2024-01-11
Payer: MEDICARE

## 2024-01-11 VITALS — BODY MASS INDEX: 22.2 KG/M2 | TEMPERATURE: 96.9 F | WEIGHT: 130 LBS | HEIGHT: 64 IN

## 2024-01-11 DIAGNOSIS — E04.2 NONTOXIC MULTINODULAR GOITER: ICD-10-CM

## 2024-01-11 DIAGNOSIS — E04.1 THYROID NODULE: Primary | ICD-10-CM

## 2024-01-11 DIAGNOSIS — J39.2 MASS OF NASOPHARYNX: ICD-10-CM

## 2024-01-11 PROCEDURE — 1036F TOBACCO NON-USER: CPT | Performed by: OTOLARYNGOLOGY

## 2024-01-11 PROCEDURE — 1126F AMNT PAIN NOTED NONE PRSNT: CPT | Performed by: OTOLARYNGOLOGY

## 2024-01-11 PROCEDURE — 99214 OFFICE O/P EST MOD 30 MIN: CPT | Performed by: OTOLARYNGOLOGY

## 2024-01-11 PROCEDURE — 1159F MED LIST DOCD IN RCRD: CPT | Performed by: OTOLARYNGOLOGY

## 2024-01-11 NOTE — PROGRESS NOTES
Chief Complaint   Patient presents with    Follow-up     F/U U/S THYROID     HPI:  Giovanna Flores is a 79 y.o. female she was recently at the dentist who palpated a lump on the right side of her neck.  Ultimately underwent CT scan of the neck which showed no neck mass.  However it did show a dominant left-sided thyroid nodule.  Had ultrasound performed which did show a dominant left-sided thyroid nodule.  No prior thyroid history.  No sore throat, hoarseness, dysphagia.  CT also shows some asymmetry in the right fossa of Rosenmuller.    US THYROID; 12/4/2023      INDICATION:  Signs/Symptoms:thyroid goiter with nodule on left lobe of thyroid as  seen on CT of neck.   COMPARISON:  CT soft tissue neck 11/17/2023  FINDINGS:    Thyroid gland is heterogeneous in echogenicity.      Right lobe of the thyroid: 4.3 cm x 1.2 cm x 1.8 cm cm. 3 nodules:  0.8 x0.8 x 0.5 cm.   0.8 x 0.9 x 0.8 cm.   1.2 x1.2 x 0.9 cm is seen at the inferior pole thyroid.      Left lobe of the thyroid: 4.1 cm x 1.7 cm x 1.9 cm cm. A dominant  solid nodule can be seen within the left thyroid lobe measuring 2.3 x  1.7 x 1.8 cm. This has hypervascular color Doppler flow noted.      Thyroid isthmus: 0.2 cm cm. Unremarkable.      IMPRESSION:  TI-RADS 4-moderately suspicious. Ultrasound-guided fine-needle  aspiration of the dominant left thyroid nodule is suggested.      CT:  IMPRESSION:  1. Component of asymmetric mucosal thickening in the region of the  right fossa of Rosenmuller in the nasopharynx with underlying  postinflammatory change or even mucosal lesion not excluded with  direct observation by ENT recommended.      2. No discrete lesion deep to the level of the skin marker in the  right posterolateral neck.      3. Nodular goiter with a nodule in the left lobe of the thyroid  measuring up to 2.1 cm. Consider follow-up ultrasound.  PMH:  Past Medical History:   Diagnosis Date    Hypertension      History reviewed. No pertinent surgical history.   "    Medications:     Current Outpatient Medications:     amLODIPine (Norvasc) 5 mg tablet, Take 1 tablet (5 mg) by mouth once daily., Disp: , Rfl:     cholecalciferol (Vitamin D-3) 50 mcg (2,000 unit) capsule, Take 2 capsules (100 mcg) by mouth early in the morning.., Disp: , Rfl:     glucosam/chond-msm1/C/lina/bor (GLUCOSAMINE-CHOND-MSM COMPLEX ORAL), Take by mouth., Disp: , Rfl:     hydroCHLOROthiazide (HYDRODiuril) 12.5 mg tablet, Take 1 tablet (12.5 mg) by mouth once daily., Disp: , Rfl:     atorvastatin (Lipitor) 10 mg tablet, Take 1 tablet (10 mg) by mouth once daily., Disp: , Rfl:      Allergies:  Allergies   Allergen Reactions    Erythromycin Unknown    Penicillins Unknown    Azithromycin Palpitations        ROS:  Review of systems normal unless stated otherwise in the HPI and/or PMH.    Physical Exam:  Temperature 36.1 °C (96.9 °F), height 1.626 m (5' 4\"), weight 59 kg (130 lb). Body mass index is 22.31 kg/m².     GENERAL APPEARANCE: Well developed and well nourished.  Alert and oriented in no acute distress.  Normal vocal quality.      HEAD/FACE: No erythema or edema or facial tenderness.  Normal facial nerve function bilaterally.    EAR:       EXTERNAL: Normal pinnas and external auditory canals without lesion or obstructing wax.       MIDDLE EAR: Tympanic membranes intact and mobile with normal landmarks.  Middle ear space appears well aerated.       TUBE STATUS: N/A       MASTOID CAVITY: N/A       HEARING: Gross hearing assessment is within normal limits.      NOSE:       VISUALIZED USING: Anterior rhinoscopy with headlight and nasal speculum.       DORSUM: Midline, nontraumatic appearance.       MUCOSA: Normal-appearing.       SECRETIONS: Normal.       SEPTUM: Midline and nonobstructing.       INFERIOR TURBINATES: Normal.       MIDDLE TURBINATES/MEATUS: N/A       BLEEDING: N/A         ORAL CAVITY/PHARYNX:       TEETH: Adequate dentition.       TONGUE: No mass or lesion.  Normal mobility.       FLOOR OF " MOUTH: No mass or lesion.       PALATE: Normal hard palate, soft palate, and uvula.       OROPHARYNX: Normal without mass or lesion.       BUCCAL MUCOSA/GBS: Normal without mass or lesion.       LIPS: Normal.    LARYNX/HYPOPHARYNX/NASOPHARYNX: N/A    NECK: No palpable masses or abnormal adenopathy.  Trachea is midline.  Prominent right carotid bulb.    THYROID: Palpable nodule with swallowing left side.    SALIVARY GLANDS: Normal bilateral parotid and submandibular glands by inspection and palpation.    TMJ's: Normal.    NEURO: Cranial nerve exam grossly normal bilaterally.       Assessment/Plan   Giovanna was seen today for follow-up.  Diagnoses and all orders for this visit:  Thyroid nodule (Primary)  Nontoxic multinodular goiter  Mass of nasopharynx     Educated the dominant left thyroid nodule carries about a 10% risk of malignancy therefore should we should further arrange for ultrasound-guided fine-needle aspiration biopsy in my office.  Provide education about this including the procedure and the risks such as but limited to bleeding and infection.  At that time we will perform nasal endoscopy.  Follow up for For needle biopsy and nasal endoscopy.     Alberto Blakely MD

## 2024-01-12 ENCOUNTER — APPOINTMENT (OUTPATIENT)
Dept: PHYSICAL THERAPY | Facility: CLINIC | Age: 79
End: 2024-01-12
Payer: MEDICARE

## 2024-01-17 ENCOUNTER — APPOINTMENT (OUTPATIENT)
Dept: PHYSICAL THERAPY | Facility: CLINIC | Age: 79
End: 2024-01-17
Payer: MEDICARE

## 2024-01-18 NOTE — PROGRESS NOTES
"Physical Therapy Treatment    Patient Name: Giovanna Flores  MRN: [id number]  Encounter date:  1/19/2024             Visit Number:  Visit count could not be calculated. Make sure you are using a visit which is associated with an episode. (including evaluation)  Planned total visits: ***  Visit Authorized:  ***    Current Problem  No diagnosis found.      Precautions       Pain       Subjective  General        ***    Objective  ***      Treatment:    There-Ex::  NuStep L2 x 6'  Supine:  90/90 hamstring stretch 2x30\" ea  Figure 4 piriformis stretch push/pull 2x30\"ea  SKTC 2x30\" Ea  Ball flexion x 20     Seated on dynadisc:  March in place 2x10  Overhead press purple ball 2x10- DNP  LAQ  2x10    Standing:  MoFlex calf stretch 3x30\"- DNP     Stir the pot x 20 ea 10# crossover symetry cords       Manual:  STM over L lumbar paraspinals, around L PSIS, L proximal glutes- prone          Activity tolerance:  {Activity:49591}    OP EDUCATION:       Assessment:     Pt's response to treatment:  ***  Areas of improvements:  ***  Limitations/deficits:  ***    Pain end of session:  ***    Plan:     {BASPLAN:52383}    Assessment of current progress against goals:  {BASPTNOTEGOALASSESSMENT:66581}    Goals:       STG  Patient to be independent with HEP- MET     LTG-  Patient to walk for 30 minutes without need to sit-MET  2.   Patient to exercise without pain- NOT MET  3.   Patient to improve Oswestry to <25% impaired- NOT MET             "

## 2024-01-19 ENCOUNTER — APPOINTMENT (OUTPATIENT)
Dept: PHYSICAL THERAPY | Facility: CLINIC | Age: 79
End: 2024-01-19
Payer: MEDICARE

## 2024-01-23 ENCOUNTER — APPOINTMENT (OUTPATIENT)
Dept: PHYSICAL THERAPY | Facility: CLINIC | Age: 79
End: 2024-01-23
Payer: MEDICARE

## 2024-01-24 NOTE — PROGRESS NOTES
"Physical Therapy Treatment    Patient Name: Giovanna Flores  MRN: [id number]  Encounter date:  1/25/2024  Time Calculation  Start Time: 0920  Stop Time: 1005  Time Calculation (min): 45 min     PT Therapeutic Procedures Time Entry  Manual Therapy Time Entry: 10  Therapeutic Exercise Time Entry: 30    Visit Number:  13 (including evaluation)  Planned total visits: 20  Visit Authorized:  20    Current Problem  1. Piriformis syndrome of left side  Follow Up In Physical Therapy          Precautions  none    Pain  2/10  Left sided LPS  Greatest in morning    Subjective  General  Patient in good spirits; compliant with HEP.   Patient with chief complaint of left sided LBP- greatest in AM       Objective  Less tender to palpate    Treatment:      Ther-Ex  NuStep L2 x 7'  Supine:  90/90 hamstring stretch 2x30\" ea  Figure 4 piriformis stretch push/pull 2x30\"ea  SKTC 2x30\" Ea  Ball flexion x 20     Seated on dynadisc:  March in place 2x10  Overhead press purple ball 2x10- DNP  LAQ  2x10    Standing:  MoFlex calf stretch 2x30\"     Stir the pot x 20 ea 10# crossover symetry cords     Manual:  STM over L lumbar paraspinals, around L PSIS, L proximal glutes- prone         Activity tolerance:  good    OP EDUCATION:  Continue with HEP    Assessment:     Pt's response to treatment:  good  Areas of improvements:  flexibility  Limitations/deficits:  pain    Pain end of session:    2-3    Plan:     Continue with current POC with the following changes advance as able    Assessment of current progress against goals:  Progressing toward functional goals    Goals:      STG  Patient to be independent with HEP- MET     LTG-  Patient to walk for 30 minutes without need to sit-MET  2.   Patient to exercise without pain- NOT MET  3.   Patient to improve Oswestry to <25% impaired- NOT MET                       "

## 2024-01-25 ENCOUNTER — TREATMENT (OUTPATIENT)
Dept: PHYSICAL THERAPY | Facility: CLINIC | Age: 79
End: 2024-01-25
Payer: MEDICARE

## 2024-01-25 DIAGNOSIS — G57.02 PIRIFORMIS SYNDROME OF LEFT SIDE: Primary | ICD-10-CM

## 2024-01-25 PROCEDURE — 97140 MANUAL THERAPY 1/> REGIONS: CPT | Mod: GP

## 2024-01-25 PROCEDURE — 97110 THERAPEUTIC EXERCISES: CPT | Mod: GP

## 2024-01-29 NOTE — PROGRESS NOTES
"Physical Therapy Treatment    Patient Name: Giovanna Flores  MRN: [id number]  Encounter date:  1/30/2024  Time Calculation  Start Time: 0755  Stop Time: 0840  Time Calculation (min): 45 min     PT Therapeutic Procedures Time Entry  Manual Therapy Time Entry: 10  Therapeutic Exercise Time Entry: 30    Visit Number:  14 (including evaluation)  Planned total visits: 20  Visit Authorized:  20    Current Problem  1. Piriformis syndrome of left side  Follow Up In Physical Therapy              Precautions  none    Pain  Up to 8 in AM  Presenlty 0/10 after stretching    Subjective  General  Patient reports continued AM pain left LB/buttock; dramatic reduction with stretching in AM         Objective  Modest spasm left LPS/piriformis    Treatment:       Ther-Ex  NuStep L2 x 7'  Supine:  90/90 hamstring stretch 2x30\" ea  Figure 4 piriformis stretch push/pull 2x30\"ea  SKTC 2x30\" Ea  Ball flexion x 20     Seated on dynadisc:  March in place 2x10 with UE movements  Overhead press purple ball 2x10- DNP  LAQ  2x10    Standing:  MoFlex calf stretch 2x30\"     Stir the pot x 20 ea 10# crossover symetry cords     Manual:  STM over L lumbar paraspinals, around L PSIS, L proximal glutes- prone     Activity tolerance:  good    OP EDUCATION:  Continue with HEP    Assessment:     Pt's response to treatment:  good  Areas of improvements:  left sided LBP  Limitations/deficits:  right shoulder pain    Pain end of session:    0/10    Plan:  Continue with POC; advance core strengthening as able    Assessment of current progress against goals:  Progressing toward functional goals    Goals:      STG  Patient to be independent with HEP- MET     LTG-  Patient to walk for 30 minutes without need to sit-MET  2.   Patient to exercise without pain- NOT MET  3.   Patient to improve Oswestry to <25% impaired- NOT MET          "

## 2024-01-30 ENCOUNTER — TREATMENT (OUTPATIENT)
Dept: PHYSICAL THERAPY | Facility: CLINIC | Age: 79
End: 2024-01-30
Payer: MEDICARE

## 2024-01-30 DIAGNOSIS — G57.02 PIRIFORMIS SYNDROME OF LEFT SIDE: Primary | ICD-10-CM

## 2024-01-30 PROCEDURE — 97110 THERAPEUTIC EXERCISES: CPT | Mod: GP

## 2024-01-30 PROCEDURE — 97140 MANUAL THERAPY 1/> REGIONS: CPT | Mod: GP

## 2024-01-31 NOTE — PROGRESS NOTES
"Physical Therapy Treatment    Patient Name: Giovanna Flores  MRN: [id number]  Encounter date:  2/1/2024  Time Calculation  Start Time: 0915  Stop Time: 1000  Time Calculation (min): 45 min     PT Therapeutic Procedures Time Entry  Manual Therapy Time Entry: 10  Therapeutic Exercise Time Entry: 30    Visit Number:  15 (including evaluation)  Planned total visits: 20  Visit Authorized/Insurance Considerations:  20    Current Problem  1. Piriformis syndrome of left side  Follow Up In Physical Therapy              Precautions   none    Pain  3/10    Subjective  General  Patient reports \"mornings are always toughest\".  Patient with ml of left sided LBP as before.    Objective  Improving body mechanics    Treatment:    Ther-Ex  NuStep L2 x 7'  Supine:  90/90 hamstring stretch 2x30\" ea  Figure 4 piriformis stretch push/pull 2x30\"ea  SKTC 2x30\" Ea  Ball flexion x 20     Seated on dynadisc:  March in place 2x10 with UE movements  Overhead press purple ball 2x10- DNP  LAQ  2x10- with UE movements    Standing:  MoFlex calf stretch 2x30\"  Satnding hip ABD 2 x 10     Stir the pot x 20 ea 10# crossover symetry cords     Manual:  STM over L lumbar paraspinals, around L PSIS, L proximal glutes- prone       Activity tolerance:  good    OP EDUCATION:  Continue with HEP    Assessment:     Pt's response to treatment:  good  Areas of improvements:  core stability  Limitations/deficits:  AM pain and stiffness    Pain end of session:    2-3/10    Plan:     Continue with current POC with the following changes advance as able    Assessment of current progress against goals:  Progressing toward functional goals         Goals:       STG  Patient to be independent with HEP- MET     LTG-20 visits  Patient to walk for 30 minutes without need to sit-MET  2.   Patient to exercise without pain- NOT MET  3.   Patient to improve Oswestry to <25% impaired- NOT MET                "

## 2024-02-01 ENCOUNTER — TREATMENT (OUTPATIENT)
Dept: PHYSICAL THERAPY | Facility: CLINIC | Age: 79
End: 2024-02-01
Payer: MEDICARE

## 2024-02-01 DIAGNOSIS — G57.02 PIRIFORMIS SYNDROME OF LEFT SIDE: Primary | ICD-10-CM

## 2024-02-01 PROCEDURE — 97140 MANUAL THERAPY 1/> REGIONS: CPT | Mod: GP

## 2024-02-01 PROCEDURE — 97110 THERAPEUTIC EXERCISES: CPT | Mod: GP

## 2024-02-07 ENCOUNTER — PROCEDURE VISIT (OUTPATIENT)
Dept: OTOLARYNGOLOGY | Facility: CLINIC | Age: 79
End: 2024-02-07
Payer: MEDICARE

## 2024-02-07 DIAGNOSIS — E04.1 THYROID NODULE: Primary | ICD-10-CM

## 2024-02-07 PROCEDURE — 10005 FNA BX W/US GDN 1ST LES: CPT | Performed by: OTOLARYNGOLOGY

## 2024-02-07 PROCEDURE — 88112 CYTOPATH CELL ENHANCE TECH: CPT | Performed by: PATHOLOGY

## 2024-02-07 PROCEDURE — 88112 CYTOPATH CELL ENHANCE TECH: CPT

## 2024-02-07 NOTE — PROGRESS NOTES
Diagnosis: Left thyroid nodule(s)  Procedure: Ultrasound guided fine-needle aspiration biopsy  Local anesthesia  Specimens sent to pathology in CytoLyt and Afirma solution  No bleeding  No complication  No implants  Findings: Dominant left-sided thyroid nodule.  Real-time ultrasound today was 1.9 x 2.2 x 1.7 cm.  On the right-hand side there is a smaller heterogeneous isoechoic nodule measuring 1.2 cm.  There are also 2 subcentimeter nodules.  Biopsy was taken today only of the left-sided thyroid nodule.  The patient is aware of the procedure including the postoperative care and the risks, such as but not limited to, bleeding, infection, failure to get adequate specimen.  The patient does wish to proceed.  Description: After informed consent and time out the patient was placed on the table in a supine manner.  A shoulder roll was placed.  Diagnostic ultrasound was performed cataloging any nodule(s).  After this was done I anesthetized the skin with 1% Xylocaine with epinephrine.  Following this under direct ultrasound visualization and guidance a 25-gauge needle was visibly entered into the left thyroid nodule and gentle suction was applied obtaining a sample.  This was placed in fixative.  2 samples were placed in CytoLyt and 2 samples were placed in Afirma solution.  Postoperative ultrasound failed to show any hematoma or complication.

## 2024-02-09 ENCOUNTER — APPOINTMENT (OUTPATIENT)
Dept: PHYSICAL THERAPY | Facility: CLINIC | Age: 79
End: 2024-02-09
Payer: MEDICARE

## 2024-02-10 DIAGNOSIS — E78.2 MIXED HYPERLIPIDEMIA: ICD-10-CM

## 2024-02-10 DIAGNOSIS — I10 PRIMARY HYPERTENSION: Primary | ICD-10-CM

## 2024-02-11 RX ORDER — AMLODIPINE BESYLATE 5 MG/1
5 TABLET ORAL DAILY
Qty: 90 TABLET | Refills: 0 | Status: SHIPPED | OUTPATIENT
Start: 2024-02-11 | End: 2024-05-10

## 2024-02-11 RX ORDER — ATORVASTATIN CALCIUM 10 MG/1
10 TABLET, FILM COATED ORAL DAILY
Qty: 90 TABLET | Refills: 0 | Status: SHIPPED | OUTPATIENT
Start: 2024-02-11 | End: 2024-05-10

## 2024-02-11 RX ORDER — HYDROCHLOROTHIAZIDE 12.5 MG/1
12.5 TABLET ORAL
Qty: 90 TABLET | Refills: 0 | Status: SHIPPED | OUTPATIENT
Start: 2024-02-11 | End: 2024-05-10

## 2024-02-12 LAB
LABORATORY COMMENT REPORT: NORMAL
LABORATORY COMMENT REPORT: NORMAL
PATH REPORT.FINAL DX SPEC: NORMAL
PATH REPORT.GROSS SPEC: NORMAL
PATH REPORT.RELEVANT HX SPEC: NORMAL
PATH REPORT.TOTAL CANCER: NORMAL

## 2024-02-14 ENCOUNTER — TREATMENT (OUTPATIENT)
Dept: PHYSICAL THERAPY | Facility: CLINIC | Age: 79
End: 2024-02-14
Payer: MEDICARE

## 2024-02-14 DIAGNOSIS — G57.02 PIRIFORMIS SYNDROME OF LEFT SIDE: ICD-10-CM

## 2024-02-14 PROCEDURE — 97140 MANUAL THERAPY 1/> REGIONS: CPT | Mod: GP

## 2024-02-14 PROCEDURE — 97110 THERAPEUTIC EXERCISES: CPT | Mod: GP

## 2024-02-14 NOTE — PROGRESS NOTES
"Physical Therapy Treatment    Patient Name: Giovanna Flores  MRN: [id number]  Encounter date:  2/14/2024  Time Calculation  Start Time: 1116  Stop Time: 1201  Time Calculation (min): 45 min     PT Therapeutic Procedures Time Entry  Manual Therapy Time Entry: 12  Therapeutic Exercise Time Entry: 33    Visit Number:  16 (including evaluation)  Planned total visits: 20  Visit Authorized/Insurance Considerations:  20    Current Problem  1. Piriformis syndrome of left side  Follow Up In Physical Therapy          Precautions   none    Pain  2/10    Subjective  General  Pt feels calf stretching may be causing knee pain after visits, would like to skip that today. Overall back symptoms continue to improve.     Objective  Tenderness L glutes, piriformis    Treatment:    Ther-Ex  NuStep L2 x 5'  Supine:  90/90 hamstring stretch 2x30\" ea  Figure 4 piriformis stretch push/pull 2x30\"ea  SKTC 2x30\" Ea  L1 clamshell x15 ea    Standing:  Band sidestep yellow loop above knee 2x10        Manual:  STM over L lumbar paraspinals, around L PSIS, L proximal glutes- prone       Activity tolerance:  good    OP EDUCATION:  Continue with HEP    Assessment:  Pt's response to treatment: Pt with mild discomfort with added band exercise but this did not last. Symptoms again improved with  STM following exercise.   Areas of improvements: Symptom response to treatment  Limitations/deficits: continued pain in mornings    Pain end of session:    1/10    Plan:     Continue with current POC with the following changes advance as able    Assessment of current progress against goals:  Progressing toward functional goals         Goals:       STG  Patient to be independent with HEP- MET     LTG-20 visits  Patient to walk for 30 minutes without need to sit-MET  2.   Patient to exercise without pain- NOT MET  3.   Patient to improve Oswestry to <25% impaired- NOT MET                "

## 2024-02-16 ENCOUNTER — APPOINTMENT (OUTPATIENT)
Dept: PHYSICAL THERAPY | Facility: CLINIC | Age: 79
End: 2024-02-16
Payer: MEDICARE

## 2024-02-19 NOTE — PROGRESS NOTES
"Physical Therapy Treatment    Patient Name: Giovanna Flores  MRN: [id number]  Encounter date:  2/20/2024  Time Calculation  Start Time: 1020  Stop Time: 1100  Time Calculation (min): 40 min     PT Therapeutic Procedures Time Entry  Manual Therapy Time Entry: 10  Therapeutic Exercise Time Entry: 30    Visit Number:  17 (including evaluation)  Planned total visits: 20  Visits Authorized/Insurance Coverage:  20    Current Problem  1. Piriformis syndrome of left side  Follow Up In Physical Therapy              Precautions  spine    Pain  Up to 5/10 upon waking  Reduces to 1/10 with movement    Subjective  General  Patient in good spirits; reports AM LBP which improves throughout day.    Objective  Modest low back tenderness left      Treatment:      Ther-Ex  NuStep L2 x 6'  Supine:  90/90 hamstring stretch 2x30\" ea  Figure 4 piriformis stretch push/pull 2x30\"ea  SKTC 2x30\" Ea  L1 clamshell x15 ea    Standing:  Band sidestep yellow loop above knee 8'x  6 - some LBP    Stir the pot 10# crossover symetry cord x 20 CW/CCW      Manual:  STM over L lumbar paraspinals, around L PSIS, L proximal glutes- prone        Current HEP:  Low back/hips stretching    Activity tolerance:  good    OP EDUCATION:  Continue with HEP    Assessment:     Pt's response to treatment:  good  Areas of improvements:  core strenght  Limitations/deficits:  AM pain    Pain end of session:   1/10    Plan:  Continue with POC; advance PREs as able      Assessment of current progress against goals:  Progressing toward functional goals    Goals:      STG  Patient to be independent with HEP- MET     LTG-20 visits  Patient to walk for 30 minutes without need to sit-MET  2.   Patient to exercise without pain- NOT MET  3.   Patient to improve Oswestry to <25% impaired- NOT MET                "

## 2024-02-20 ENCOUNTER — TREATMENT (OUTPATIENT)
Dept: PHYSICAL THERAPY | Facility: CLINIC | Age: 79
End: 2024-02-20
Payer: MEDICARE

## 2024-02-20 DIAGNOSIS — G57.02 PIRIFORMIS SYNDROME OF LEFT SIDE: Primary | ICD-10-CM

## 2024-02-20 PROCEDURE — 97140 MANUAL THERAPY 1/> REGIONS: CPT | Mod: GP

## 2024-02-20 PROCEDURE — 97110 THERAPEUTIC EXERCISES: CPT | Mod: GP

## 2024-02-26 ENCOUNTER — APPOINTMENT (OUTPATIENT)
Dept: PHYSICAL THERAPY | Facility: CLINIC | Age: 79
End: 2024-02-26
Payer: MEDICARE

## 2024-02-26 NOTE — PROGRESS NOTES
"Physical Therapy Treatment    Patient Name: Giovanna Flores  MRN: [id number]  Encounter date:  2/26/2024             Visit Number:  Visit count could not be calculated. Make sure you are using a visit which is associated with an episode. (including evaluation)  Planned total visits: 18  Visits Authorized/Insurance Coverage:  20    Current Problem  No diagnosis found.      Precautions  soinr    Pain       Subjective  General        ***    Objective    Treatment:      Ther-Ex  NuStep L2 x 6'  Supine:  90/90 hamstring stretch 2x30\" ea  Figure 4 piriformis stretch push/pull 2x30\"ea  SKTC 2x30\" Ea  L1 clamshell x15 ea    Standing:  Band sidestep yellow loop above knee 8'x  6 - some LBP     Stir the pot 10# crossover symetry cord x 20 CW/CCW       Manual:  STM over L lumbar paraspinals, around L PSIS, L proximal glutes- prone        Current HEP:  Low back/hip stretching  Core strengthening    Activity tolerance:  {Activity:78338}    OP EDUCATION:       Assessment:     Pt's response to treatment:  ***  Areas of improvements:  ***  Limitations/deficits:  ***    Pain end of session: ***    Plan:     {BASPLAN:54951}    Assessment of current progress against goals:  {BASPTNOTEGOALASSESSMENT:88358}        Goals:      STG  Patient to be independent with HEP- MET     LTG-20 visits  Patient to walk for 30 minutes without need to sit-MET  2.   Patient to exercise without pain- NOT MET  3.   Patient to improve Oswestry to <25% impaired- NOT MET                "

## 2024-02-27 ENCOUNTER — OFFICE VISIT (OUTPATIENT)
Dept: PRIMARY CARE | Facility: CLINIC | Age: 79
End: 2024-02-27
Payer: MEDICARE

## 2024-02-27 VITALS
SYSTOLIC BLOOD PRESSURE: 124 MMHG | HEIGHT: 64 IN | OXYGEN SATURATION: 98 % | HEART RATE: 86 BPM | BODY MASS INDEX: 22.91 KG/M2 | DIASTOLIC BLOOD PRESSURE: 72 MMHG | WEIGHT: 134.2 LBS

## 2024-02-27 DIAGNOSIS — M53.3 SACROILIAC JOINT PAIN: Primary | ICD-10-CM

## 2024-02-27 DIAGNOSIS — M17.11 PRIMARY OSTEOARTHRITIS OF RIGHT KNEE: ICD-10-CM

## 2024-02-27 PROCEDURE — 3074F SYST BP LT 130 MM HG: CPT | Performed by: PHYSICIAN ASSISTANT

## 2024-02-27 PROCEDURE — 20605 DRAIN/INJ JOINT/BURSA W/O US: CPT | Performed by: PHYSICIAN ASSISTANT

## 2024-02-27 PROCEDURE — 99213 OFFICE O/P EST LOW 20 MIN: CPT | Mod: 25 | Performed by: PHYSICIAN ASSISTANT

## 2024-02-27 PROCEDURE — 3078F DIAST BP <80 MM HG: CPT | Performed by: PHYSICIAN ASSISTANT

## 2024-02-27 PROCEDURE — 1036F TOBACCO NON-USER: CPT | Performed by: PHYSICIAN ASSISTANT

## 2024-02-27 PROCEDURE — 1160F RVW MEDS BY RX/DR IN RCRD: CPT | Performed by: PHYSICIAN ASSISTANT

## 2024-02-27 PROCEDURE — 99213 OFFICE O/P EST LOW 20 MIN: CPT | Performed by: PHYSICIAN ASSISTANT

## 2024-02-27 PROCEDURE — 1126F AMNT PAIN NOTED NONE PRSNT: CPT | Performed by: PHYSICIAN ASSISTANT

## 2024-02-27 PROCEDURE — 1159F MED LIST DOCD IN RCRD: CPT | Performed by: PHYSICIAN ASSISTANT

## 2024-02-27 ASSESSMENT — PATIENT HEALTH QUESTIONNAIRE - PHQ9
1. LITTLE INTEREST OR PLEASURE IN DOING THINGS: NOT AT ALL
SUM OF ALL RESPONSES TO PHQ9 QUESTIONS 1 AND 2: 0
2. FEELING DOWN, DEPRESSED OR HOPELESS: NOT AT ALL

## 2024-02-27 ASSESSMENT — ENCOUNTER SYMPTOMS
OCCASIONAL FEELINGS OF UNSTEADINESS: 0
LOSS OF SENSATION IN FEET: 0
DEPRESSION: 0

## 2024-02-27 ASSESSMENT — PAIN SCALES - GENERAL: PAINLEVEL: 0-NO PAIN

## 2024-02-27 NOTE — PROGRESS NOTES
"Subjective   Patient ID: Giovanna Flores is a 79 y.o. female who presents for cortisone shot .    Presents for for eval of R knee pain - hx of OA and prior injections. Has been doing PT for her lower back and SI joint pain and had aggravation to R knee pain. She has been having l leg numbness at times as well and continued pain to L SI joint in spite of therapy. She does exercise regularly and is very active.  Knee is improved today and has no pain.          Review of Systems   All other systems reviewed and are negative.      Objective   /72   Pulse 86   Ht 1.626 m (5' 4\")   Wt 60.9 kg (134 lb 3.2 oz)   SpO2 98%   BMI 23.04 kg/m²     Physical Exam  Constitutional:       Appearance: Normal appearance.   Musculoskeletal:      Right shoulder: No deformity or effusion. Normal range of motion. Decreased strength (4/5 infraspinatus).      Lumbar back: Tenderness present. No deformity or spasms.        Back:       Comments: L SI joint tenderness to palpation   Neurological:      Mental Status: She is alert.     L SI injection administered under sterile conditions: 2ml Lidocaine 1%, 1ml Triamcinalone 40mg injected after betadine prep and topical ethyl chloride. Pt tolerated well without complication.     Assessment/Plan   Diagnoses and all orders for this visit:  Sacroiliac joint pain  Primary osteoarthritis of right knee         "

## 2024-02-27 NOTE — PATIENT INSTRUCTIONS
Recommend follow up for knee injection prior to travel, sooner with increased pain.   MRI LS spine if symptoms continue.

## 2024-03-14 ENCOUNTER — TREATMENT (OUTPATIENT)
Dept: PHYSICAL THERAPY | Facility: CLINIC | Age: 79
End: 2024-03-14
Payer: MEDICARE

## 2024-03-14 DIAGNOSIS — M54.42 ACUTE LEFT-SIDED LOW BACK PAIN WITH LEFT-SIDED SCIATICA: ICD-10-CM

## 2024-03-14 DIAGNOSIS — G57.02 PIRIFORMIS SYNDROME OF LEFT SIDE: Primary | ICD-10-CM

## 2024-03-14 PROCEDURE — 97110 THERAPEUTIC EXERCISES: CPT | Mod: GP

## 2024-03-14 PROCEDURE — 97140 MANUAL THERAPY 1/> REGIONS: CPT | Mod: GP

## 2024-03-14 NOTE — PROGRESS NOTES
"Physical Therapy Treatment    Patient Name: Giovanna Flores  MRN: [id number]  Encounter date:  3/14/2024  Time Calculation  Start Time: 0930  Stop Time: 1015  Time Calculation (min): 45 min     PT Therapeutic Procedures Time Entry  Manual Therapy Time Entry: 10  Therapeutic Exercise Time Entry: 30    Visit Number:  18 (including evaluation)  Planned total visits: 20  Visits Authorized/Insurance Coverage:  20    Current Problem  1. Piriformis syndrome of left side        2. Acute left-sided low back pain with left-sided sciatica            Precautions  spine    Pain  Left LB/SI  3/10    Subjective  General  Patient reports s/p injection left SI 2/27.  Patient with limited changes post injection.  Patient   reports continued pain with prolonged sitting and standing.  Patient to contact Paul Childs if she feels she needs another injection.  Patient does feel symptoms have improved form onset but remain quite limiting.  Patient does report some decrease in night pain.      Objective  ++ tenderness     Treatment:    Ther-Ex  NuStep L2 x 6'  Supine:  90/90 hamstring stretch 2x30\" ea  Figure 4 piriformis stretch push/pull 2x30\"ea  SKTC 2x30\" Ea  L1 clamshell x15 ea    Standing:  Band sidestep peach loop above knee 8'x  6      Stir the pot 10# crossover symetry cord x 20 CW/CCW       Manual:  STM over L lumbar paraspinals, around L PSIS, L proximal glutes- prone         Current HEP:  Low back/hips stretching     Activity tolerance:  good    OP EDUCATION:  Continue with HEP    Assessment:  Patient with modest improvement post SI injection.  Patient appears to be at somewhat of a plateau.  Pt's response to treatment:  good  Areas of improvements:  core strength  Limitations/deficits:  pain    Pain end of session:   3/10    Plan:     Continue with current POC with the following changes advance as able/assess response to injection.    Assessment of current progress against goals:  Progressing toward functional " goals        Goals:    STG  Patient to be independent with HEP- MET     LTG-20 visits  Patient to walk for 30 minutes without need to sit-MET  2.   Patient to exercise without pain- NOT MET  3.   Patient to improve Oswestry to <25% impaired- NOT MET

## 2024-04-02 ENCOUNTER — APPOINTMENT (OUTPATIENT)
Dept: PHYSICAL THERAPY | Facility: CLINIC | Age: 79
End: 2024-04-02
Payer: MEDICARE

## 2024-04-05 ENCOUNTER — TREATMENT (OUTPATIENT)
Dept: PHYSICAL THERAPY | Facility: CLINIC | Age: 79
End: 2024-04-05
Payer: MEDICARE

## 2024-04-05 DIAGNOSIS — M54.42 ACUTE LEFT-SIDED LOW BACK PAIN WITH LEFT-SIDED SCIATICA: Primary | ICD-10-CM

## 2024-04-05 PROCEDURE — 97110 THERAPEUTIC EXERCISES: CPT | Mod: GP

## 2024-04-05 NOTE — PROGRESS NOTES
"    Physical Therapy Treatment    Patient Name: Giovanna Flores  MRN: [id number]  Encounter date:  4/5/2024  Time Calculation  Start Time: 0850  Stop Time: 0930  Time Calculation (min): 40 min     PT Therapeutic Procedures Time Entry  Manual Therapy Time Entry: 10  Therapeutic Exercise Time Entry: 25    Visit Number:  19 (including evaluation)  Planned total visits: 20  Visits Authorized/Insurance Coverage:  20    Current Problem  1. Acute left-sided low back pain with left-sided sciatica              Precautions  spine    Pain  1/10    Subjective  General  Patient verbalized preparedeness for discharge with continued HEP.  Patient feels injection has finally take hold.  Patient is back to all pre injury exercise    Objective  Improved trunk ROM- no pain  Oswestry  11 points  22% impaired    Treatment:    Ther-Ex  NuStep L2 x 6'  Supine:  90/90 hamstring stretch 2x30\" ea  Figure 4 piriformis stretch push/pull 2x30\"ea  SKTC 2x30\" Ea  L1 clamshell x15 ea    Standing:  Band sidestep peach loop above knee 8'x  6      Stir the pot 10# crossover symetry cord x 20 CW/CCW       Manual:  STM over L lumbar paraspinals, around L PSIS, L proximal glutes- prone         Current HEP:  Low back/hips stretching    Activity tolerance:  good    OP EDUCATION:  Continue with HEP    Assessment:     Pt's response to treatment:  good  Areas of improvements:  exercise tolerance /LBP  Limitations/deficits:  NA    Pain end of session:   1/10    Plan:     Discharge- continue with HEP    Assessment of current progress against goals:  Progressing toward functional goals    Goals:    STG  Patient to be independent with HEP- MET     LTG-20 visits  Patient to walk for 30 minutes without need to sit-MET  2.   Patient to exercise without pain- MET  3.   Patient to improve Oswestry to <25% impaired- MET                "

## 2024-04-26 ENCOUNTER — OFFICE VISIT (OUTPATIENT)
Dept: PRIMARY CARE | Facility: CLINIC | Age: 79
End: 2024-04-26
Payer: MEDICARE

## 2024-04-26 ENCOUNTER — HOSPITAL ENCOUNTER (OUTPATIENT)
Dept: RADIOLOGY | Facility: CLINIC | Age: 79
Discharge: HOME | End: 2024-04-26
Payer: MEDICARE

## 2024-04-26 VITALS
HEART RATE: 80 BPM | OXYGEN SATURATION: 99 % | HEIGHT: 64 IN | DIASTOLIC BLOOD PRESSURE: 68 MMHG | WEIGHT: 137 LBS | BODY MASS INDEX: 23.39 KG/M2 | SYSTOLIC BLOOD PRESSURE: 124 MMHG

## 2024-04-26 DIAGNOSIS — M53.3 SACROILIAC JOINT PAIN: Primary | ICD-10-CM

## 2024-04-26 DIAGNOSIS — M54.16 LUMBAR RADICULOPATHY: ICD-10-CM

## 2024-04-26 PROCEDURE — 72100 X-RAY EXAM L-S SPINE 2/3 VWS: CPT

## 2024-04-26 PROCEDURE — 1160F RVW MEDS BY RX/DR IN RCRD: CPT

## 2024-04-26 PROCEDURE — 99213 OFFICE O/P EST LOW 20 MIN: CPT

## 2024-04-26 PROCEDURE — 1036F TOBACCO NON-USER: CPT

## 2024-04-26 PROCEDURE — 1159F MED LIST DOCD IN RCRD: CPT

## 2024-04-26 PROCEDURE — 72100 X-RAY EXAM L-S SPINE 2/3 VWS: CPT | Performed by: RADIOLOGY

## 2024-04-26 PROCEDURE — 3074F SYST BP LT 130 MM HG: CPT

## 2024-04-26 PROCEDURE — 3078F DIAST BP <80 MM HG: CPT

## 2024-04-26 PROCEDURE — 1125F AMNT PAIN NOTED PAIN PRSNT: CPT

## 2024-04-26 RX ORDER — GABAPENTIN 100 MG/1
100 CAPSULE ORAL 2 TIMES DAILY
Qty: 60 CAPSULE | Refills: 1 | Status: SHIPPED | OUTPATIENT
Start: 2024-04-26 | End: 2024-06-25

## 2024-04-26 ASSESSMENT — PAIN SCALES - GENERAL: PAINLEVEL: 8

## 2024-04-26 ASSESSMENT — ENCOUNTER SYMPTOMS
NUMBNESS: 1
BACK PAIN: 1

## 2024-04-26 NOTE — PROGRESS NOTES
"Subjective   Patient ID: Giovanna Flores is a 79 y.o. female who presents for Back Pain (Continuing back pain /la).      Left SI joint pain. Patient had SI cortisone injection with Alberto Childs on 2/27/25, which only gave temporary taper. Also has finished 20 sessions of physical therapy, which helped some but pain continues to wax and wane. Tylenol arthritis helps some. Occasionally has left leg numbness and radiation of pain down left leg. She does exercise regularly and is very active.            Review of Systems   Musculoskeletal:  Positive for back pain.   Neurological:  Positive for numbness.       Objective   /68   Pulse 80   Ht 1.626 m (5' 4\")   Wt 62.1 kg (137 lb)   SpO2 99%   BMI 23.52 kg/m²     Physical Exam  Constitutional:       Appearance: Normal appearance.   Musculoskeletal:      Right shoulder: Normal strength.      Lumbar back: Tenderness present. No deformity or spasms.        Back:       Comments: L SI joint tenderness to palpation   Neurological:      Mental Status: She is alert.         Assessment/Plan   Diagnoses and all orders for this visit:  Sacroiliac joint pain  -     gabapentin (Neurontin) 100 mg capsule; Take 1 capsule (100 mg) by mouth 2 times a day.  Lumbar radiculopathy  -     gabapentin (Neurontin) 100 mg capsule; Take 1 capsule (100 mg) by mouth 2 times a day.  -     XR lumbar spine 2-3 views; Future         "

## 2024-04-29 ENCOUNTER — TELEPHONE (OUTPATIENT)
Dept: PRIMARY CARE | Facility: CLINIC | Age: 79
End: 2024-04-29
Payer: MEDICARE

## 2024-04-29 NOTE — TELEPHONE ENCOUNTER
Xray shows mild to moderate arthritis throughout the lumbar spine. Continue with new medication gabapentin.

## 2024-05-10 DIAGNOSIS — R53.83 FATIGUE, UNSPECIFIED TYPE: Primary | ICD-10-CM

## 2024-05-10 DIAGNOSIS — E55.9 VITAMIN D DEFICIENCY: ICD-10-CM

## 2024-05-10 DIAGNOSIS — R35.1 NOCTURIA: ICD-10-CM

## 2024-05-10 DIAGNOSIS — I10 PRIMARY HYPERTENSION: ICD-10-CM

## 2024-05-10 DIAGNOSIS — E78.2 MIXED HYPERLIPIDEMIA: ICD-10-CM

## 2024-05-10 RX ORDER — ATORVASTATIN CALCIUM 10 MG/1
10 TABLET, FILM COATED ORAL DAILY
Qty: 90 TABLET | Refills: 0 | Status: SHIPPED | OUTPATIENT
Start: 2024-05-10 | End: 2024-05-12

## 2024-05-10 RX ORDER — AMLODIPINE BESYLATE 5 MG/1
5 TABLET ORAL DAILY
Qty: 90 TABLET | Refills: 0 | Status: SHIPPED | OUTPATIENT
Start: 2024-05-10 | End: 2024-05-12

## 2024-05-10 RX ORDER — HYDROCHLOROTHIAZIDE 12.5 MG/1
12.5 TABLET ORAL
Qty: 90 TABLET | Refills: 0 | Status: SHIPPED | OUTPATIENT
Start: 2024-05-10

## 2024-05-12 RX ORDER — ATORVASTATIN CALCIUM 10 MG/1
10 TABLET, FILM COATED ORAL DAILY
Qty: 90 TABLET | Refills: 0 | Status: SHIPPED | OUTPATIENT
Start: 2024-05-12

## 2024-05-12 RX ORDER — AMLODIPINE BESYLATE 5 MG/1
5 TABLET ORAL DAILY
Qty: 90 TABLET | Refills: 0 | Status: SHIPPED | OUTPATIENT
Start: 2024-05-12

## 2024-05-14 ENCOUNTER — LAB (OUTPATIENT)
Dept: LAB | Facility: LAB | Age: 79
End: 2024-05-14
Payer: MEDICARE

## 2024-05-14 DIAGNOSIS — R53.83 FATIGUE, UNSPECIFIED TYPE: ICD-10-CM

## 2024-05-14 DIAGNOSIS — R35.1 NOCTURIA: ICD-10-CM

## 2024-05-14 DIAGNOSIS — E78.2 MIXED HYPERLIPIDEMIA: ICD-10-CM

## 2024-05-14 DIAGNOSIS — E55.9 VITAMIN D DEFICIENCY: ICD-10-CM

## 2024-05-14 LAB
25(OH)D3 SERPL-MCNC: 42 NG/ML (ref 31–100)
ALBUMIN SERPL-MCNC: 4.4 G/DL (ref 3.5–5)
ALP BLD-CCNC: 95 U/L (ref 35–125)
ALT SERPL-CCNC: 13 U/L (ref 5–40)
ANION GAP SERPL CALC-SCNC: 12 MMOL/L
APPEARANCE UR: CLEAR
AST SERPL-CCNC: 21 U/L (ref 5–40)
BASOPHILS # BLD AUTO: 0.05 X10*3/UL (ref 0–0.1)
BASOPHILS NFR BLD AUTO: 0.8 %
BILIRUB SERPL-MCNC: 0.5 MG/DL (ref 0.1–1.2)
BILIRUB UR STRIP.AUTO-MCNC: NEGATIVE MG/DL
BUN SERPL-MCNC: 20 MG/DL (ref 8–25)
CALCIUM SERPL-MCNC: 9.7 MG/DL (ref 8.5–10.4)
CHLORIDE SERPL-SCNC: 103 MMOL/L (ref 97–107)
CHOLEST SERPL-MCNC: 183 MG/DL (ref 133–200)
CHOLEST/HDLC SERPL: 2.7 {RATIO}
CO2 SERPL-SCNC: 29 MMOL/L (ref 24–31)
COLOR UR: NORMAL
CREAT SERPL-MCNC: 0.8 MG/DL (ref 0.4–1.6)
EGFRCR SERPLBLD CKD-EPI 2021: 76 ML/MIN/1.73M*2
EOSINOPHIL # BLD AUTO: 0.14 X10*3/UL (ref 0–0.4)
EOSINOPHIL NFR BLD AUTO: 2.2 %
ERYTHROCYTE [DISTWIDTH] IN BLOOD BY AUTOMATED COUNT: 13.6 % (ref 11.5–14.5)
GLUCOSE SERPL-MCNC: 97 MG/DL (ref 65–99)
GLUCOSE UR STRIP.AUTO-MCNC: NORMAL MG/DL
HCT VFR BLD AUTO: 41.3 % (ref 36–46)
HDLC SERPL-MCNC: 68 MG/DL
HGB BLD-MCNC: 12.9 G/DL (ref 12–16)
HOLD SPECIMEN: NORMAL
IMM GRANULOCYTES # BLD AUTO: 0.01 X10*3/UL (ref 0–0.5)
IMM GRANULOCYTES NFR BLD AUTO: 0.2 % (ref 0–0.9)
KETONES UR STRIP.AUTO-MCNC: NEGATIVE MG/DL
LDLC SERPL CALC-MCNC: 95 MG/DL (ref 65–130)
LEUKOCYTE ESTERASE UR QL STRIP.AUTO: NEGATIVE
LYMPHOCYTES # BLD AUTO: 1.34 X10*3/UL (ref 0.8–3)
LYMPHOCYTES NFR BLD AUTO: 21 %
MCH RBC QN AUTO: 28.4 PG (ref 26–34)
MCHC RBC AUTO-ENTMCNC: 31.2 G/DL (ref 32–36)
MCV RBC AUTO: 91 FL (ref 80–100)
MONOCYTES # BLD AUTO: 0.55 X10*3/UL (ref 0.05–0.8)
MONOCYTES NFR BLD AUTO: 8.6 %
NEUTROPHILS # BLD AUTO: 4.28 X10*3/UL (ref 1.6–5.5)
NEUTROPHILS NFR BLD AUTO: 67.2 %
NITRITE UR QL STRIP.AUTO: NEGATIVE
NRBC BLD-RTO: 0 /100 WBCS (ref 0–0)
PH UR STRIP.AUTO: 5 [PH]
PLATELET # BLD AUTO: 392 X10*3/UL (ref 150–450)
POTASSIUM SERPL-SCNC: 4.2 MMOL/L (ref 3.4–5.1)
PROT SERPL-MCNC: 6.7 G/DL (ref 5.9–7.9)
PROT UR STRIP.AUTO-MCNC: NEGATIVE MG/DL
RBC # BLD AUTO: 4.55 X10*6/UL (ref 4–5.2)
RBC # UR STRIP.AUTO: NEGATIVE /UL
SODIUM SERPL-SCNC: 144 MMOL/L (ref 133–145)
SP GR UR STRIP.AUTO: 1.02
TRIGL SERPL-MCNC: 100 MG/DL (ref 40–150)
TSH SERPL DL<=0.05 MIU/L-ACNC: 1.31 MIU/L (ref 0.27–4.2)
UROBILINOGEN UR STRIP.AUTO-MCNC: NORMAL MG/DL
WBC # BLD AUTO: 6.4 X10*3/UL (ref 4.4–11.3)

## 2024-05-14 PROCEDURE — 84443 ASSAY THYROID STIM HORMONE: CPT

## 2024-05-14 PROCEDURE — 81003 URINALYSIS AUTO W/O SCOPE: CPT

## 2024-05-14 PROCEDURE — 85025 COMPLETE CBC W/AUTO DIFF WBC: CPT

## 2024-05-14 PROCEDURE — 82306 VITAMIN D 25 HYDROXY: CPT

## 2024-05-14 PROCEDURE — 80053 COMPREHEN METABOLIC PANEL: CPT

## 2024-05-14 PROCEDURE — 36415 COLL VENOUS BLD VENIPUNCTURE: CPT

## 2024-05-14 PROCEDURE — 80061 LIPID PANEL: CPT

## 2024-05-30 ENCOUNTER — OFFICE VISIT (OUTPATIENT)
Dept: PRIMARY CARE | Facility: CLINIC | Age: 79
End: 2024-05-30
Payer: MEDICARE

## 2024-05-30 VITALS
HEART RATE: 86 BPM | SYSTOLIC BLOOD PRESSURE: 118 MMHG | HEIGHT: 64 IN | DIASTOLIC BLOOD PRESSURE: 70 MMHG | BODY MASS INDEX: 23.22 KG/M2 | WEIGHT: 136 LBS | OXYGEN SATURATION: 99 %

## 2024-05-30 DIAGNOSIS — E78.2 MIXED HYPERLIPIDEMIA: ICD-10-CM

## 2024-05-30 DIAGNOSIS — E04.1 THYROID NODULE: ICD-10-CM

## 2024-05-30 DIAGNOSIS — I10 PRIMARY HYPERTENSION: ICD-10-CM

## 2024-05-30 DIAGNOSIS — Z23 ENCOUNTER FOR IMMUNIZATION: ICD-10-CM

## 2024-05-30 DIAGNOSIS — M54.42 ACUTE LEFT-SIDED LOW BACK PAIN WITH LEFT-SIDED SCIATICA: Primary | ICD-10-CM

## 2024-05-30 DIAGNOSIS — Z53.20 COLON CANCER SCREENING DECLINED: ICD-10-CM

## 2024-05-30 PROCEDURE — 3074F SYST BP LT 130 MM HG: CPT | Performed by: FAMILY MEDICINE

## 2024-05-30 PROCEDURE — 99214 OFFICE O/P EST MOD 30 MIN: CPT | Performed by: FAMILY MEDICINE

## 2024-05-30 PROCEDURE — 1159F MED LIST DOCD IN RCRD: CPT | Performed by: FAMILY MEDICINE

## 2024-05-30 PROCEDURE — 1158F ADVNC CARE PLAN TLK DOCD: CPT | Performed by: FAMILY MEDICINE

## 2024-05-30 PROCEDURE — 3078F DIAST BP <80 MM HG: CPT | Performed by: FAMILY MEDICINE

## 2024-05-30 PROCEDURE — 1160F RVW MEDS BY RX/DR IN RCRD: CPT | Performed by: FAMILY MEDICINE

## 2024-05-30 PROCEDURE — G2211 COMPLEX E/M VISIT ADD ON: HCPCS | Performed by: FAMILY MEDICINE

## 2024-05-30 PROCEDURE — 1123F ACP DISCUSS/DSCN MKR DOCD: CPT | Performed by: FAMILY MEDICINE

## 2024-05-30 PROCEDURE — 90677 PCV20 VACCINE IM: CPT | Performed by: FAMILY MEDICINE

## 2024-05-30 PROCEDURE — 1126F AMNT PAIN NOTED NONE PRSNT: CPT | Performed by: FAMILY MEDICINE

## 2024-05-30 ASSESSMENT — ENCOUNTER SYMPTOMS
OCCASIONAL FEELINGS OF UNSTEADINESS: 0
LOSS OF SENSATION IN FEET: 0
DEPRESSION: 0

## 2024-05-30 ASSESSMENT — PAIN SCALES - GENERAL: PAINLEVEL: 0-NO PAIN

## 2024-05-30 NOTE — PROGRESS NOTES
"Subjective   Patient ID: Giovanna Flores is a 79 y.o. female who presents for Follow-up (Lab results).    She recently had a thyroid nodule biopsy that was negative. [facility] rosie is following up with ENT.    She had a flare up of low back pain - going to PT, she is also taking gabapentin and improving. She denies any pain currently, feeling much better.   Hyperlipidemia:          Patient here for F/U. tolerating medicine well.          Labs ordered today.      Hypertension:          Patient here for F/U. uncontrolled.          Med compliance yes.          Med side effects none.            Review of Systems   Constitutional:  Positive for fatigue. Negative for chills and fever.   HENT:  Negative for ear pain and postnasal drip.    Respiratory:  Negative for cough, shortness of breath and wheezing.    Cardiovascular:  Negative for chest pain, palpitations and leg swelling.   Gastrointestinal:  Negative for constipation, nausea and vomiting.   Genitourinary:  Negative for frequency and hematuria.   Musculoskeletal:  Positive for arthralgias and myalgias.   Skin:  Negative for rash.   Neurological:  Negative for dizziness, tremors and weakness.   Psychiatric/Behavioral:  Negative for confusion. The patient is not nervous/anxious.        Objective   /70   Pulse 86   Ht 1.626 m (5' 4\")   Wt 61.7 kg (136 lb)   SpO2 99%   BMI 23.34 kg/m²     Physical Exam  Vitals reviewed.   Constitutional:       General: She is not in acute distress.     Appearance: Normal appearance. She is not ill-appearing.   Cardiovascular:      Rate and Rhythm: Normal rate and regular rhythm.      Heart sounds: Normal heart sounds. No murmur heard.  Pulmonary:      Breath sounds: Normal breath sounds.   Abdominal:      General: Abdomen is flat. Bowel sounds are normal.      Palpations: Abdomen is soft.   Musculoskeletal:      Comments: No reproducible pain, no deformity, neg straight leg test   Neurological:      General: No focal deficit present. "      Mental Status: She is alert and oriented to person, place, and time.   Psychiatric:         Mood and Affect: Mood normal.         Behavior: Behavior normal.         Assessment/Plan   Problem List Items Addressed This Visit             ICD-10-CM    Acute left-sided low back pain with left-sided sciatica - Primary M54.42    Hyperlipidemia E78.5    Hypertension I10     Other Visit Diagnoses         Codes    Colon cancer screening declined     Z53.20    Encounter for immunization     Z23    Thyroid nodule     E04.1    follow up with ENT

## 2024-06-02 ASSESSMENT — ENCOUNTER SYMPTOMS
FATIGUE: 1
HEMATURIA: 0
ARTHRALGIAS: 1
CONSTIPATION: 0
NERVOUS/ANXIOUS: 0
WHEEZING: 0
SHORTNESS OF BREATH: 0
TREMORS: 0
DIZZINESS: 0
FREQUENCY: 0
NAUSEA: 0
WEAKNESS: 0
FEVER: 0
CONFUSION: 0
PALPITATIONS: 0
CHILLS: 0
MYALGIAS: 1
COUGH: 0
VOMITING: 0

## 2024-06-10 ENCOUNTER — HOSPITAL ENCOUNTER (OUTPATIENT)
Dept: RADIOLOGY | Facility: HOSPITAL | Age: 79
Discharge: HOME | End: 2024-06-10
Payer: MEDICARE

## 2024-06-10 DIAGNOSIS — R92.8 ABNORMAL MAMMOGRAM OF RIGHT BREAST: ICD-10-CM

## 2024-06-10 DIAGNOSIS — N60.01 BREAST CYST, RIGHT: ICD-10-CM

## 2024-06-10 DIAGNOSIS — N60.01 CYST OF RIGHT BREAST: Primary | ICD-10-CM

## 2024-06-10 PROCEDURE — 76642 ULTRASOUND BREAST LIMITED: CPT | Mod: RIGHT SIDE | Performed by: RADIOLOGY

## 2024-06-10 PROCEDURE — 76982 USE 1ST TARGET LESION: CPT | Mod: RT

## 2024-06-10 PROCEDURE — 76642 ULTRASOUND BREAST LIMITED: CPT | Mod: RT

## 2024-08-06 DIAGNOSIS — M54.16 LUMBAR RADICULOPATHY: ICD-10-CM

## 2024-08-06 DIAGNOSIS — I10 PRIMARY HYPERTENSION: ICD-10-CM

## 2024-08-06 DIAGNOSIS — M53.3 SACROILIAC JOINT PAIN: ICD-10-CM

## 2024-08-06 RX ORDER — HYDROCHLOROTHIAZIDE 12.5 MG/1
12.5 TABLET ORAL
Qty: 90 TABLET | Refills: 1 | Status: SHIPPED | OUTPATIENT
Start: 2024-08-06

## 2024-08-06 RX ORDER — GABAPENTIN 100 MG/1
100 CAPSULE ORAL 2 TIMES DAILY
Qty: 60 CAPSULE | Refills: 0 | Status: SHIPPED | OUTPATIENT
Start: 2024-08-06

## 2024-09-20 ENCOUNTER — TELEPHONE (OUTPATIENT)
Dept: OTOLARYNGOLOGY | Facility: CLINIC | Age: 79
End: 2024-09-20
Payer: MEDICARE

## 2024-09-20 DIAGNOSIS — E04.1 THYROID NODULE: ICD-10-CM

## 2024-09-20 NOTE — TELEPHONE ENCOUNTER
Patient called for annual ultrasound, she will be scheduling it for the beginning of the year. Please sign order for thyroid ultrasound.

## 2024-10-09 ENCOUNTER — OFFICE VISIT (OUTPATIENT)
Dept: PRIMARY CARE | Facility: CLINIC | Age: 79
End: 2024-10-09
Payer: MEDICARE

## 2024-10-09 VITALS
HEART RATE: 83 BPM | WEIGHT: 138 LBS | BODY MASS INDEX: 23.56 KG/M2 | OXYGEN SATURATION: 98 % | HEIGHT: 64 IN | SYSTOLIC BLOOD PRESSURE: 138 MMHG | DIASTOLIC BLOOD PRESSURE: 78 MMHG

## 2024-10-09 DIAGNOSIS — E78.2 MIXED HYPERLIPIDEMIA: ICD-10-CM

## 2024-10-09 DIAGNOSIS — R53.83 FATIGUE, UNSPECIFIED TYPE: ICD-10-CM

## 2024-10-09 DIAGNOSIS — G89.29 CHRONIC LEFT-SIDED LOW BACK PAIN WITH LEFT-SIDED SCIATICA: ICD-10-CM

## 2024-10-09 DIAGNOSIS — M79.671 BILATERAL FOOT PAIN: ICD-10-CM

## 2024-10-09 DIAGNOSIS — E55.9 VITAMIN D DEFICIENCY: ICD-10-CM

## 2024-10-09 DIAGNOSIS — Z00.00 MEDICARE ANNUAL WELLNESS VISIT, SUBSEQUENT: Primary | ICD-10-CM

## 2024-10-09 DIAGNOSIS — E53.8 VITAMIN B12 DEFICIENCY: ICD-10-CM

## 2024-10-09 DIAGNOSIS — I10 PRIMARY HYPERTENSION: ICD-10-CM

## 2024-10-09 DIAGNOSIS — M19.90 OSTEOARTHRITIS, UNSPECIFIED OSTEOARTHRITIS TYPE, UNSPECIFIED SITE: ICD-10-CM

## 2024-10-09 DIAGNOSIS — M79.672 BILATERAL FOOT PAIN: ICD-10-CM

## 2024-10-09 DIAGNOSIS — Z12.31 SCREENING MAMMOGRAM FOR BREAST CANCER: ICD-10-CM

## 2024-10-09 DIAGNOSIS — Z13.820 SCREENING FOR OSTEOPOROSIS: ICD-10-CM

## 2024-10-09 DIAGNOSIS — Z53.20 COLON CANCER SCREENING DECLINED: ICD-10-CM

## 2024-10-09 DIAGNOSIS — Z78.0 MENOPAUSE: ICD-10-CM

## 2024-10-09 DIAGNOSIS — M54.42 CHRONIC LEFT-SIDED LOW BACK PAIN WITH LEFT-SIDED SCIATICA: ICD-10-CM

## 2024-10-09 PROBLEM — M54.40 ACUTE BACK PAIN WITH SCIATICA: Status: ACTIVE | Noted: 2024-10-09

## 2024-10-09 PROCEDURE — 1170F FXNL STATUS ASSESSED: CPT | Performed by: FAMILY MEDICINE

## 2024-10-09 PROCEDURE — 99215 OFFICE O/P EST HI 40 MIN: CPT | Performed by: FAMILY MEDICINE

## 2024-10-09 PROCEDURE — 99397 PER PM REEVAL EST PAT 65+ YR: CPT | Performed by: FAMILY MEDICINE

## 2024-10-09 PROCEDURE — 99214 OFFICE O/P EST MOD 30 MIN: CPT | Performed by: FAMILY MEDICINE

## 2024-10-09 PROCEDURE — G0439 PPPS, SUBSEQ VISIT: HCPCS | Performed by: FAMILY MEDICINE

## 2024-10-09 PROCEDURE — 3078F DIAST BP <80 MM HG: CPT | Performed by: FAMILY MEDICINE

## 2024-10-09 PROCEDURE — 1159F MED LIST DOCD IN RCRD: CPT | Performed by: FAMILY MEDICINE

## 2024-10-09 PROCEDURE — 1036F TOBACCO NON-USER: CPT | Performed by: FAMILY MEDICINE

## 2024-10-09 PROCEDURE — 1126F AMNT PAIN NOTED NONE PRSNT: CPT | Performed by: FAMILY MEDICINE

## 2024-10-09 PROCEDURE — 1123F ACP DISCUSS/DSCN MKR DOCD: CPT | Performed by: FAMILY MEDICINE

## 2024-10-09 PROCEDURE — 1160F RVW MEDS BY RX/DR IN RCRD: CPT | Performed by: FAMILY MEDICINE

## 2024-10-09 PROCEDURE — 3075F SYST BP GE 130 - 139MM HG: CPT | Performed by: FAMILY MEDICINE

## 2024-10-09 RX ORDER — AMLODIPINE BESYLATE 5 MG/1
5 TABLET ORAL DAILY
Qty: 90 TABLET | Refills: 3 | Status: SHIPPED | OUTPATIENT
Start: 2024-10-09

## 2024-10-09 RX ORDER — HYDROCHLOROTHIAZIDE 12.5 MG/1
12.5 TABLET ORAL
Qty: 90 TABLET | Refills: 3 | Status: SHIPPED | OUTPATIENT
Start: 2024-10-09

## 2024-10-09 RX ORDER — ATORVASTATIN CALCIUM 10 MG/1
10 TABLET, FILM COATED ORAL DAILY
Qty: 90 TABLET | Refills: 3 | Status: SHIPPED | OUTPATIENT
Start: 2024-10-09

## 2024-10-09 ASSESSMENT — ACTIVITIES OF DAILY LIVING (ADL)
BATHING: INDEPENDENT
NEEDS ASSISTANCE WITH FOOD: INDEPENDENT
PILL BOX USED: YES
TAKING MEDICATION: INDEPENDENT
PREPARING MEALS: INDEPENDENT
MANAGING FINANCES: INDEPENDENT
EATING: INDEPENDENT
USING TELEPHONE: INDEPENDENT
ADEQUATE_TO_COMPLETE_ADL: YES
DRESSING: INDEPENDENT
STIL DRIVING: YES
DOING HOUSEWORK: INDEPENDENT
FEEDING: INDEPENDENT
TOILETING: INDEPENDENT
USING TRANSPORTATION: INDEPENDENT
JUDGMENT_ADEQUATE_SAFELY_COMPLETE_DAILY_ACTIVITIES: YES
GROCERY SHOPPING: INDEPENDENT

## 2024-10-09 ASSESSMENT — ENCOUNTER SYMPTOMS
FATIGUE: 1
NERVOUS/ANXIOUS: 0
CONFUSION: 0
MYALGIAS: 0
WEAKNESS: 0
SHORTNESS OF BREATH: 0
OCCASIONAL FEELINGS OF UNSTEADINESS: 0
DEPRESSION: 0
DIZZINESS: 0
ARTHRALGIAS: 1
WHEEZING: 0
LOSS OF SENSATION IN FEET: 0
COUGH: 0
FREQUENCY: 0
CHILLS: 0
FEVER: 0
NAUSEA: 0
VOMITING: 0
CONSTIPATION: 0
PALPITATIONS: 0
HEMATURIA: 0

## 2024-10-09 ASSESSMENT — GERIATRIC MINI NUTRITIONAL ASSESSMENT (MNA)
C GENERAL MOBILITY: GOES OUT
A HAS FOOD INTAKE DECLINED OVER THE PAST 3 MONTHS DUE TO LOSS OF APPETITE, DIGESTIVE PROBLEMS, CHEWING OR SWALLOWING DIFFICULTIES?: NO DECREASE IN FOOD INTAKE
B WEIGHT LOSS DURING THE LAST 3 MONTHS: DOES NOT KNOW

## 2024-10-09 ASSESSMENT — PAIN SCALES - GENERAL: PAINLEVEL: 0-NO PAIN

## 2024-10-09 ASSESSMENT — COGNITIVE AND FUNCTIONAL STATUS - GENERAL: VERBAL FLUENCY - ANIMAL NAMES (0 TO 25): 3

## 2024-10-09 NOTE — PROGRESS NOTES
Subjective   Patient ID: Giovanna Flores is a 79 y.o. female who presents for Annual Exam.    Mini Cognitive Screening:          Three Word Recall             Words:  Book, apple, window.            Patient able to repeat?  Yes .         Three Word Recall after 5mins             Word recall Score (0-3):  3 .         Clock Drawing             Given preprinted [place] and instructions?  Yes .            Clock Draw Score (0 or 2):  2 .         Clock Draw plus Word Recall Score             Mini Cog Result <3 Pos:  5 .         Follow-up:             .  Not needed, negative result .     General Comments:          Patient here for Medicare Wellness Exam.         Active medical problems:         PMH/PSH/FMH/SHX: reviewed, noted below.         Other providers:         Medications: reviewed, noted below.         Depression screening: denies feeling down, depressed, hopeless. Denies having felt little interest or pleasure in doing things.         Functional ability and safety: Get up and go test is <30s. Pt denies any issues with ADLs, including phone, transportation, shopping, preparing meals, housework,laundry, medications or managing money. No home safety concerns, including having rugs in the hallway, lack grab bars in the bathroom, lack handrails, on the stairs or have poor lighting. Pt denies falls.         Hearing changes: Pt denies changes or concerns.         Advanced directives: discussed and recommended.          Preventative services: sheet reviewed, scanned, copy provided to patient.   No opioid use  Has a good feeling of their overall wellbeing   Vit D def - taking a supplement    Hypertension:          Patient here for F/U. at goal.          Med compliance yes.          Med side effects none.          Chest pain none.          Leg edema none.          Palpitations none.      Hyperlipidemia:          Patient here for F/U. tolerating medicine well.          Labs ordered today.      Medications reviewed:           "Current medications Use reviewed and recorded.          Vitamin/Mineral supplements Use reviewed and recorded.      Review family history:          Reviewed See family history.      Review past history:          Reviewed See past history.      Review surgical history:          Reviewed See surgical history.   Declines colonoscopy, agrees to a bone density, UTD on mammogram, encourage her to get a shingrix  She has persistent low back pain, some radiculopathy and bilateral foot pain - her spine xray shows osteoarthritis changes- agrees to PT         Review of Systems   Constitutional:  Positive for fatigue. Negative for chills and fever.   HENT:  Negative for ear pain and postnasal drip.    Respiratory:  Negative for cough, shortness of breath and wheezing.    Cardiovascular:  Negative for chest pain, palpitations and leg swelling.   Gastrointestinal:  Negative for constipation, nausea and vomiting.   Genitourinary:  Negative for frequency and hematuria.   Musculoskeletal:  Positive for arthralgias. Negative for myalgias.   Neurological:  Negative for dizziness and weakness.   Psychiatric/Behavioral:  Negative for confusion. The patient is not nervous/anxious.        Objective   /78   Pulse 83   Ht 1.626 m (5' 4\")   Wt 62.6 kg (138 lb)   SpO2 98%   BMI 23.69 kg/m²     Physical Exam  Exam conducted with a chaperone present.   Constitutional:       General: She is not in acute distress.     Appearance: Normal appearance. She is not ill-appearing.   HENT:      Head: Normocephalic.      Right Ear: Tympanic membrane and external ear normal.      Left Ear: Tympanic membrane and external ear normal.      Nose: Nose normal. No congestion.      Mouth/Throat:      Mouth: Mucous membranes are moist.      Pharynx: No posterior oropharyngeal erythema.   Eyes:      Extraocular Movements: Extraocular movements intact.      Conjunctiva/sclera: Conjunctivae normal.      Pupils: Pupils are equal, round, and reactive to " light.   Cardiovascular:      Rate and Rhythm: Normal rate and regular rhythm.      Pulses: Normal pulses.      Heart sounds: Normal heart sounds. No murmur heard.  Pulmonary:      Effort: Pulmonary effort is normal. No respiratory distress.      Breath sounds: Normal breath sounds. No wheezing.   Chest:   Breasts:     Right: Normal. No mass, nipple discharge, skin change or tenderness.      Left: Normal. No mass, nipple discharge, skin change or tenderness.   Abdominal:      General: Bowel sounds are normal.      Palpations: Abdomen is soft. There is no mass.      Tenderness: There is no abdominal tenderness.      Hernia: No hernia is present.   Genitourinary:     Comments: declines  Musculoskeletal:         General: Normal range of motion.      Cervical back: Neck supple. No tenderness.      Right lower leg: No edema.      Left lower leg: No edema.      Comments: Mild diffuse low back pain- straight leg strain with straightening, feet with good sensation, pulses intact, slight nerve pain with light touch   Lymphadenopathy:      Cervical: No cervical adenopathy.   Skin:     General: Skin is warm.      Findings: No rash.   Neurological:      General: No focal deficit present.      Mental Status: She is alert and oriented to person, place, and time.      Motor: No weakness.      Gait: Gait normal.   Psychiatric:         Mood and Affect: Mood normal.         Behavior: Behavior normal.         Assessment/Plan   Problem List Items Addressed This Visit             ICD-10-CM    Hyperlipidemia E78.5    Relevant Medications    atorvastatin (Lipitor) 10 mg tablet    Other Relevant Orders    Comprehensive Metabolic Panel    Lipid Panel    Hypertension I10    Relevant Medications    hydroCHLOROthiazide (Microzide) 12.5 mg tablet    amLODIPine (Norvasc) 5 mg tablet    Vitamin D deficiency E55.9    Menopause Z78.0    Relevant Orders    XR DEXA bone density    Osteoarthritis M19.90    Relevant Orders    Referral to Physical  Therapy    Bilateral foot pain M79.671, M79.672    Relevant Orders    Referral to Physical Therapy    Colon cancer screening declined Z53.20     Other Visit Diagnoses         Codes    Medicare annual wellness visit, subsequent    -  Primary Z00.00    recommend shingrix at the pharmacy     Screening for osteoporosis     Z13.820    Relevant Orders    XR DEXA bone density    Chronic left-sided low back pain with left-sided sciatica     M54.42, G89.29    PT eval and treat     Relevant Orders    Referral to Physical Therapy    Vitamin B12 deficiency     E53.8    Relevant Orders    Vitamin B12    Fatigue, unspecified type     R53.83    Relevant Orders    CBC and Auto Differential    Screening mammogram for breast cancer     Z12.31    UTD

## 2024-10-10 ENCOUNTER — LAB (OUTPATIENT)
Dept: LAB | Facility: LAB | Age: 79
End: 2024-10-10
Payer: MEDICARE

## 2024-10-10 ENCOUNTER — APPOINTMENT (OUTPATIENT)
Dept: PRIMARY CARE | Facility: CLINIC | Age: 79
End: 2024-10-10
Payer: MEDICARE

## 2024-10-10 DIAGNOSIS — R53.83 FATIGUE, UNSPECIFIED TYPE: ICD-10-CM

## 2024-10-10 DIAGNOSIS — E78.2 MIXED HYPERLIPIDEMIA: ICD-10-CM

## 2024-10-10 DIAGNOSIS — E53.8 VITAMIN B12 DEFICIENCY: ICD-10-CM

## 2024-10-10 LAB
ALBUMIN SERPL BCP-MCNC: 4.3 G/DL (ref 3.4–5)
ALP SERPL-CCNC: 86 U/L (ref 33–136)
ALT SERPL W P-5'-P-CCNC: 20 U/L (ref 7–45)
ANION GAP SERPL CALCULATED.3IONS-SCNC: 10 MMOL/L (ref 10–20)
AST SERPL W P-5'-P-CCNC: 26 U/L (ref 9–39)
BASOPHILS # BLD AUTO: 0.06 X10*3/UL (ref 0–0.1)
BASOPHILS NFR BLD AUTO: 1.1 %
BILIRUB SERPL-MCNC: 0.6 MG/DL (ref 0–1.2)
BUN SERPL-MCNC: 20 MG/DL (ref 6–23)
CALCIUM SERPL-MCNC: 9.5 MG/DL (ref 8.6–10.3)
CHLORIDE SERPL-SCNC: 103 MMOL/L (ref 98–107)
CHOLEST SERPL-MCNC: 170 MG/DL (ref 0–199)
CHOLEST/HDLC SERPL: 2.6 {RATIO}
CO2 SERPL-SCNC: 31 MMOL/L (ref 21–32)
CREAT SERPL-MCNC: 0.71 MG/DL (ref 0.5–1.05)
EGFRCR SERPLBLD CKD-EPI 2021: 88 ML/MIN/1.73M*2
EOSINOPHIL # BLD AUTO: 0.11 X10*3/UL (ref 0–0.4)
EOSINOPHIL NFR BLD AUTO: 2 %
ERYTHROCYTE [DISTWIDTH] IN BLOOD BY AUTOMATED COUNT: 13.2 % (ref 11.5–14.5)
GLUCOSE SERPL-MCNC: 82 MG/DL (ref 74–99)
HCT VFR BLD AUTO: 40.6 % (ref 36–46)
HDLC SERPL-MCNC: 64.2 MG/DL
HGB BLD-MCNC: 13 G/DL (ref 12–16)
IMM GRANULOCYTES # BLD AUTO: 0.01 X10*3/UL (ref 0–0.5)
IMM GRANULOCYTES NFR BLD AUTO: 0.2 % (ref 0–0.9)
LDLC SERPL CALC-MCNC: 85 MG/DL
LYMPHOCYTES # BLD AUTO: 1.68 X10*3/UL (ref 0.8–3)
LYMPHOCYTES NFR BLD AUTO: 29.8 %
MCH RBC QN AUTO: 28.7 PG (ref 26–34)
MCHC RBC AUTO-ENTMCNC: 32 G/DL (ref 32–36)
MCV RBC AUTO: 90 FL (ref 80–100)
MONOCYTES # BLD AUTO: 0.58 X10*3/UL (ref 0.05–0.8)
MONOCYTES NFR BLD AUTO: 10.3 %
NEUTROPHILS # BLD AUTO: 3.2 X10*3/UL (ref 1.6–5.5)
NEUTROPHILS NFR BLD AUTO: 56.6 %
NON HDL CHOLESTEROL: 106 MG/DL (ref 0–149)
NRBC BLD-RTO: 0 /100 WBCS (ref 0–0)
PLATELET # BLD AUTO: 419 X10*3/UL (ref 150–450)
POTASSIUM SERPL-SCNC: 3.8 MMOL/L (ref 3.5–5.3)
PROT SERPL-MCNC: 6.5 G/DL (ref 6.4–8.2)
RBC # BLD AUTO: 4.53 X10*6/UL (ref 4–5.2)
SODIUM SERPL-SCNC: 140 MMOL/L (ref 136–145)
TRIGL SERPL-MCNC: 103 MG/DL (ref 0–149)
VIT B12 SERPL-MCNC: 405 PG/ML (ref 211–911)
VLDL: 21 MG/DL (ref 0–40)
WBC # BLD AUTO: 5.6 X10*3/UL (ref 4.4–11.3)

## 2024-10-10 PROCEDURE — 80053 COMPREHEN METABOLIC PANEL: CPT

## 2024-10-10 PROCEDURE — 36415 COLL VENOUS BLD VENIPUNCTURE: CPT

## 2024-10-10 PROCEDURE — 82607 VITAMIN B-12: CPT

## 2024-10-10 PROCEDURE — 80061 LIPID PANEL: CPT

## 2024-10-10 PROCEDURE — 85025 COMPLETE CBC W/AUTO DIFF WBC: CPT

## 2024-10-15 NOTE — PROGRESS NOTES
Physical Therapy Evaluation/Treatment    Patient Name: Giovanna Flores  MRN: [id number]  Encounter Date: 2024       Visit Number:  1/*** (including evaluation)  Planned total visits: ***  Visit Authorized/insurance considerations:  ***  Progress Report due visit #***    Current Problem:  Problem List Items Addressed This Visit    None          Surgery:  {NA:97541}    Surgery Date:  {NA:}    Date:  *** *** weeks post surgery    Back and foot pain    Subjective:  Subjective     SUBJECTIVE:  Main complaint:  ***  Onset Date:  Rx:  ***/***/;  ***  Date of Injury:  ***    Patient reported hx of injury:   ***    What aggravates symptoms:  {sit, bend, stand, walk, lift, twist, lying supine, lying prone, sidelyin}     What improves symptoms:  {sit, bend, stand, walk, lift, twist, lying supine, lying prone, sidelyin}     Previous Medical Treatment:    ***    Relevant PMH:  ***    Red flags:  {basRedFlags:41955}  ***    Imaging:  {BASDiagnostics:25271}  ***    No results found.     Previous Therapy Treatments:    {Previous PT services:42400}  Successful:  {yes/no:95189}  ***  ***    Pt stated Goal:  ***    General:       Precautions:       Pain:       Home Living:       Home type: {dkhousetype:73420}  Stairs: {yes,no:38856}  Lives with: {dkliveswith:56397}    Vocation:    {BASVocation:08471}  Job/Job tasks:  ***    Prior Function Per Pt/Caregiver Report:       OBJECTIVE:  ***    Posture:       Posture:     ROM and Strength:    Lumbar:      {BASstrengthROMwnlwflseebmariella:48699}    Lumbar AROM STRENGTH    Flexion *** ***    Extension *** ***    /////////////////////////////////////////////////////////////    AROM STRENGTH    R L R L   Rotation *** *** *** ***   Sidebend *** *** *** ***     Hip:    {BASstrengthROMwnlwflseebelow:98323}       AROM STRENGTH   Hip R L R L   Hip Flexion *** *** ***/5 ***/5   Hip Extension *** *** ***/5 ***/5   Hip Abduction *** *** ***/5 ***/5   Hip Adduction *** *** ***/5  ***/5   Internal Rotation *** *** ***/5 ***/5   External Rotation *** *** ***/5 ***/5     Knee:    {BASstrengthROMwnlwflseebelow:80072}     AROM STRENGTH   Knee R L R L   Knee Flexion *** *** ***/5 ***/5   Knee Extension *** *** ***/5 ***/5     Ankle:      {BASstrengthROMwnlwflseebelow:37498}       AROM STRENGTH   Ankle R L R L   Dorsiflexion *** *** ***/5 ***/5   Plantarflexion *** *** ***/5 ***/5   Eversion *** *** ***/5 ***/5   Inversion *** *** ***/5 ***/5        Flexibility:       R  L   Pectoralis *** ***   Piriformis *** ***   DELLA *** ***   Hip flexor *** ***   Iliotibial band *** ***   Quadriceps *** ***   Hamstring *** ***   Gastroc/Soleus *** ***        Special Tests:     Lumbar  Repeated Flexion in Standing {POSITIVE/NEGATIVE:65720}  after *** times   Repeated Extension in Standing {POSITIVE/NEGATIVE:33103}  after *** times   /////////////////////////////////////////////////////////////////////    Right Left   Anterior Translatory Instability Test {POSITIVE/NEGATIVE:26424} {POSITIVE/NEGATIVE:17986}     Neural   Right Left   Slump {POSITIVE/NEGATIVE:53113} {POSITIVE/NEGATIVE:39942}   SLR {POSITIVE/NEGATIVE:84520} {POSITIVE/NEGATIVE:70960}     Hip   Right Left   David {POSITIVE/NEGATIVE:91009} {POSITIVE/NEGATIVE:73538}   Alex {POSITIVE/NEGATIVE:24891} {POSITIVE/NEGATIVE:34104}   Piriformis {POSITIVE/NEGATIVE:86607} {POSITIVE/NEGATIVE:62780}   Scour {POSITIVE/NEGATIVE:48765} {POSITIVE/NEGATIVE:44932}   DELLA {POSITIVE/NEGATIVE:55648} {POSITIVE/NEGATIVE:48501}     Pelvis    Comment   Iliac crest {BASsymmetrical/asymmetrical:57338} ***   ASIS {BASsymmetrical/asymmetrical:94578} ***   PSIS {BASsymmetrical/asymmetrical:88718} ***   Sacral base {BASsymmetrical/asymmetrical:67884} ***   Leg length {BASsymmetrical/asymmetrical:75132} ***        Palpation:    Palpation:       Gait:       Balance:       Stairs:       Transfers:       Outcome Measures:  {PT Outcome Measures:56023}     Assessment       Pt is a 79  y.o. female who presents with impairments of ***.  Pt would benefit from skilled physical therapy to improve flexibility, ROM, strength to reduce pain and improve the patient's current level of functioning including routine exercise program.  Pt would also benefit from proper body mechanics and ergonomic training to better manage the patient's symptoms and reduce exacerbation.      Pt's recovery time and progress/outcomes will likely be impacted by the patient;s history of *** and ***.    Based on the history including personal factors and/or comorbidities, examination of body systems including body structures and function, activity limitations, and/or participation restrictions, as well as clinical presentation, patient meets criteria for {medical complexity:36788} complexity evaluation.    Plan       OP EDUCATION:       Goals:      Treatments this date:  {PT Treatments:07141}    Perform HEP as instructed and according to handouts.  Monitor pain levels, stop any exercises that increases pain level and report to therapist next visit.      Billed Treatment Times:  {Treatement times:17293}    Therapeutic modalities this date:       Billed Treatment Times:  {Treatement times:26117}    Therapeutic Activities:  {Activity:92513}    OP PT EDUCATION:      ***    Billed Treatment Times:  {Treatement times:66471}    Manual:    ***  Billed Treatment Times:  {Treatement times:85063}    Balance/NMRE:     ***    Billed Treatment Times:  {Treatement times:88740}    Plan for next visit:  ***    1.  Improve LE AROM by ** degrees at deficits  2.  Improve LE strength by ½ mm grade at deficits  3.  Improve bilateral hamstring length to **% of WFL  4.  Improve trunk flexibility to **% of WFL  5.  Improve trunk strength to ** or better  6.  Pain:  ** to **  7.  Functional Outcome Measure:  ** (**%)

## 2024-10-21 ENCOUNTER — HOSPITAL ENCOUNTER (OUTPATIENT)
Dept: RADIOLOGY | Facility: CLINIC | Age: 79
Discharge: HOME | End: 2024-10-21
Payer: MEDICARE

## 2024-10-21 DIAGNOSIS — Z13.820 SCREENING FOR OSTEOPOROSIS: ICD-10-CM

## 2024-10-21 DIAGNOSIS — Z78.0 MENOPAUSE: ICD-10-CM

## 2024-10-21 PROCEDURE — 77080 DXA BONE DENSITY AXIAL: CPT

## 2024-10-21 PROCEDURE — 77080 DXA BONE DENSITY AXIAL: CPT | Performed by: RADIOLOGY

## 2024-11-07 ENCOUNTER — APPOINTMENT (OUTPATIENT)
Dept: PHYSICAL THERAPY | Facility: CLINIC | Age: 79
End: 2024-11-07
Payer: MEDICARE

## 2024-11-20 ENCOUNTER — APPOINTMENT (OUTPATIENT)
Dept: PHYSICAL THERAPY | Facility: CLINIC | Age: 79
End: 2024-11-20
Payer: MEDICARE

## 2024-12-11 ENCOUNTER — HOSPITAL ENCOUNTER (OUTPATIENT)
Dept: RADIOLOGY | Facility: HOSPITAL | Age: 79
Discharge: HOME | End: 2024-12-11
Payer: MEDICARE

## 2024-12-11 VITALS — HEIGHT: 64 IN | BODY MASS INDEX: 23.56 KG/M2 | WEIGHT: 138 LBS

## 2024-12-11 DIAGNOSIS — R92.8 ABNORMAL MAMMOGRAM OF RIGHT BREAST: Primary | ICD-10-CM

## 2024-12-11 DIAGNOSIS — N60.01 CYST OF RIGHT BREAST: ICD-10-CM

## 2024-12-11 DIAGNOSIS — R92.8 ABNORMAL MAMMOGRAM OF RIGHT BREAST: ICD-10-CM

## 2024-12-11 PROCEDURE — G0279 TOMOSYNTHESIS, MAMMO: HCPCS | Performed by: STUDENT IN AN ORGANIZED HEALTH CARE EDUCATION/TRAINING PROGRAM

## 2024-12-11 PROCEDURE — 76642 ULTRASOUND BREAST LIMITED: CPT | Mod: RT

## 2024-12-11 PROCEDURE — 77066 DX MAMMO INCL CAD BI: CPT | Performed by: STUDENT IN AN ORGANIZED HEALTH CARE EDUCATION/TRAINING PROGRAM

## 2024-12-11 PROCEDURE — 76982 USE 1ST TARGET LESION: CPT | Mod: RT

## 2024-12-11 PROCEDURE — 77066 DX MAMMO INCL CAD BI: CPT

## 2024-12-11 PROCEDURE — 76642 ULTRASOUND BREAST LIMITED: CPT | Performed by: STUDENT IN AN ORGANIZED HEALTH CARE EDUCATION/TRAINING PROGRAM

## 2024-12-13 NOTE — PROGRESS NOTES
Physical Therapy Evaluation    Patient Name: Giovanna Flores  MRN: [id number]  Evaluation Date: 12/16/2024  Time Calculation  Start Time: 0925  Stop Time: 1010  Time Calculation (min): 45 min  PT Evaluation Time Entry  PT Evaluation (Moderate) Time Entry: 25        Insurance Information:   10/09/2024    M19.90 (ICD-10-CM) - Osteoarthritis, unspecified osteoarthritis type, unspecified site  M54.42,G89.29 (ICD-10-CM) - Chronic left-sided low back pain with left-sided sciatica  M79.671,M79.672 (ICD-10-CM) - Bilateral foot pain  Problem List Items Addressed This Visit             ICD-10-CM    Osteoarthritis - Primary M19.90    Bilateral foot pain M79.671, M79.672     Other Visit Diagnoses         Codes    Chronic left-sided low back pain with left-sided sciatica     M54.42, G89.29    PT eval and treat               Subjective   General:  Patient is a 80 yo female with diagnosis of OA/chronic left-sided LBP with sciatica/bilateral foot pain.  Patient with fairly chronic history of LBP with left LE sciatic type symptoms.  Patient with improvement in the past with PT.  Patient with awareness of LBP; however, chief complaint is bilateral foot pain/paresthesia. Patient is motivated to participate.       Precautions:  HTN  Osteoporosis- no traction       Relevant PMH:  Hyperlipemia  HTN  OA  Bilateral foot pain      Pain:  LBP/left hip pain/bilateral feet  Worst  8 /10  Worse with   Supine/PM      At best  0-1/10  Better with  Standing/postion changes     Home Living:  Home type: House  Lives with: Spouse  Occupation: retired    Hobbies/activities: exercise    Prior Function Per Pt/Caregiver Report:  Fairly active at [facility]       Imaging:  IMPRESSION:  DEXA:  Exam demonstrates osteoporosis lumbar spine and normal  mineralization left hip.      FINDINGS:   Lumbar Spine, two views       There is no evidence of a fracture. There is mild anterolisthesis of   L3 on L4 and L4 on L5. There is moderate facet disease in the  lower   lumbar spine. There is mild osteophytosis sclerosis throughout the   lumbar spine without disc space narrowing         Mild multilevel spondylosis throughout the lumbar spine. Moderate   facet disease lower lumbar spine Mild anterolisthesis L3 on L4 and L4   on L5     Objective   Posture:  FHP  Modest left sided scoliosis  Increased lumbar lordosis  Increased thoracic kyphosis         Range of Motion:  Lumbar ROM Range   Flexion WFL   Extension WFL   R rotation WFL   L rotation WFL   R sidebend WFL   L sidebend WFL         Strength:  Hip MMT L R   Hip Flexion 4-/5 4-/5   Hip Extension 4/5 4/5   Hip Abduction 4-/5 4-/5   Hip Adduction 4/5 4/5        Flexibility:  DELLA- ++     Palpation:  Modest tenderness bilateral LPS     Special Tests:  SLR negative     Gait:  modest guarding FHP       Transfers:  Good body mechanics    Outcome Measures:  Oswestry  20/50      Assessment  Pt is a 79 y.o. female who presents with impairments of LBP with radicular symptoms. These impairments have led to functional limitations including difficulty with ADLs/walking tolerance. Pt would benefit from skilled physical therapy intervention to improve above impairments and facilitate return to function.    Complexity of Evaluation: Moderate    Based on the history including personal factors and/or comorbidities, examination of body systems including body structures and function, activity limitations, and/or participation restrictions, as well as clinical presentation, patient meets criteria for above complexity evaluation.    Plan- pending authorization  1-2x/week  Up to 10 visits  Therapeutic Exercise  Manual      Insurance Plan: Payor: [employer] / Plan: [employer] / Product Type: *No Product type* /     Plan for next visit:   Initiate/progress therapeutic exercise (flexibility/core)/add manual      OP EDUCATION:  HEP  Review existing HEP    Today's Treatment:  No treatment billed today as pt  requires prior authorization  HEP to be completed daily, exercises include:  Prior HEP  SKTC  DKTC  DELLA lawson    Goals:  STG (3 visits)   Patient to be independent with HEP    LTG(10 visits)- pending authorization   Patient to perform ADLs with pain < 2/10   Patient to sleep in bed for 6-8 hours with LE symptoms <2/10   Oswestry to < 20% impaired

## 2024-12-16 ENCOUNTER — EVALUATION (OUTPATIENT)
Dept: PHYSICAL THERAPY | Facility: CLINIC | Age: 79
End: 2024-12-16
Payer: MEDICARE

## 2024-12-16 DIAGNOSIS — M79.672 BILATERAL FOOT PAIN: ICD-10-CM

## 2024-12-16 DIAGNOSIS — M79.671 BILATERAL FOOT PAIN: ICD-10-CM

## 2024-12-16 DIAGNOSIS — G89.29 CHRONIC LEFT-SIDED LOW BACK PAIN WITH LEFT-SIDED SCIATICA: ICD-10-CM

## 2024-12-16 DIAGNOSIS — M19.90 OSTEOARTHRITIS, UNSPECIFIED OSTEOARTHRITIS TYPE, UNSPECIFIED SITE: Primary | ICD-10-CM

## 2024-12-16 DIAGNOSIS — M54.42 CHRONIC LEFT-SIDED LOW BACK PAIN WITH LEFT-SIDED SCIATICA: ICD-10-CM

## 2024-12-16 PROCEDURE — 97162 PT EVAL MOD COMPLEX 30 MIN: CPT | Mod: GP

## 2025-01-10 NOTE — PROGRESS NOTES
"    Physical Therapy Treatment    Patient Name: Giovanna Flores  MRN: [id number]  Encounter date:  1/13/2025  Time Calculation  Start Time: 0930  Stop Time: 1015  Time Calculation (min): 45 min     PT Therapeutic Procedures Time Entry  Manual Therapy Time Entry: 12  Therapeutic Exercise Time Entry: 27       Visit Number:  2 (including evaluation)  Planned total visits: 11  Visits Authorized/Insurance Coverage:  1/2/25: EVAL ONLY-Auth Rcvd 10 visits 12/23-3/17 for OP PT  12/13/2024: AUTH REQUIRED, 20.00 COPAY, 100% COVERAGE, 4500 OOP-NOT MET, [facility]     10/09/2024  M19.90 (ICD-10-CM) - Osteoarthritis, unspecified osteoarthritis type, unspecified site  M54.42,G89.29 (ICD-10-CM) - Chronic left-sided low back pain with left-sided sciatica    Current Problem  Problem List Items Addressed This Visit             ICD-10-CM    Osteoarthritis - Primary M19.90       Precautions:  HTN  Osteoporosis- no traction       Pain  Bilateral LE paresthesia       Subjective  General  Patient in good spirits a    Objective  Good body mechanics    Treatment:      Ther-Ex  NuStep L2 x 8'  Supine:  90/90 hamstring stretch 2x30\" ea  Figure 4 piriformis stretch push/pull 2x30\"ea  SKTC 2x30\" Ea  L1 clamshell x15 ea    Standing:  Band sidestep peach loop above knee 8'x  6 - DNP  Airex beam tandem x 6  Airex beam sidestepping x 6     Stir the pot 10# crossover symetry cord x 20 CW/CCW- DNP       Manual:  STM over bilateral lumbar paraspinals, prone     Current HEP:  [facility]  DELLA lawson    Has patient been compliant with HEP? Yes    Activity tolerance:  good    OP EDUCATION:  HEP    Assessment:  Pt's response to treatment:  good  Areas of improvements:  exercise tolerance  Limitations/deficits:  good    Pain end of session:   No gross changes    Plan:  Continue with current POC with the following changes advance as able    Assessment of current progress against goals:  Progressing toward functional goals       Goals:  STG (3 visits)   " Patient to be independent with HEP     LTG(11 visits)   Patient to perform ADLs with pain < 2/10   Patient to sleep in bed for 6-8 hours with LE symptoms <2/10   Oswestry to < 20% impaired

## 2025-01-13 ENCOUNTER — TREATMENT (OUTPATIENT)
Dept: PHYSICAL THERAPY | Facility: CLINIC | Age: 80
End: 2025-01-13
Payer: MEDICARE

## 2025-01-13 DIAGNOSIS — M19.90 OSTEOARTHRITIS, UNSPECIFIED OSTEOARTHRITIS TYPE, UNSPECIFIED SITE: Primary | ICD-10-CM

## 2025-01-13 PROCEDURE — 97140 MANUAL THERAPY 1/> REGIONS: CPT | Mod: GP

## 2025-01-13 PROCEDURE — 97110 THERAPEUTIC EXERCISES: CPT | Mod: GP

## 2025-01-17 NOTE — PROGRESS NOTES
"    Physical Therapy Treatment    Patient Name: Giovanna Flores  MRN: [id number]  Encounter date:  1/20/2025                Visit Number:  3 (including evaluation)  Planned total visits: 11  Visits Authorized/Insurance Coverage:  1/2/25: EVAL ONLY-Auth Rcvd 10 visits 12/23-3/17 for OP PT  12/13/2024: AUTH REQUIRED, 20.00 COPAY, 100% COVERAGE, 4500 OOP-NOT MET, [facility]     10/09/2024  M19.90 (ICD-10-CM) - Osteoarthritis, unspecified osteoarthritis type, unspecified site  M54.42,G89.29 (ICD-10-CM) - Chronic left-sided low back pain with left-sided sciatica  M79.671,M79.672 (ICD-10-CM) - Bilateral foot pain      Current Problem  Problem List Items Addressed This Visit    None      Precautions:  HTN  Osteoporosis- no traction          Pain       Subjective  General        ***    Objective  ***    Treatment:  Therapeutic Exercise  NuStep L2 x 8'  Supine:  90/90 hamstring stretch 2x30\" ea  Figure 4 piriformis stretch push/pull 2x30\"ea  SKTC 2x30\" Ea  L1 clamshell x15 ea    Standing:  Band sidestep peach loop above knee 8'x  6 - DNP  Airex beam tandem x 6  Airex beam sidestepping x 6     Stir the pot 10# crossover symetry cord x 20 CW/CCW- DNP       Manual:  STM over bilateral lumbar paraspinals, prone      Current HEP:  [email or web address]  DKTC  DELLA lawson       Has patient been compliant with HEP? {Yes/No:00068}    Activity tolerance:  {Activity:90083}    OP EDUCATION:       Assessment:  Pt's response to treatment:  ***  Areas of improvements:  ***  Limitations/deficits:  ***    Pain end of session: ***    Plan:  {BASPLAN:51090}    Assessment of current progress against goals:  {BASPTNOTEGOALASSESSMENT:28566}      Goals:  STG (3 visits)   Patient to be independent with HEP     LTG(11 visits)   Patient to perform ADLs with pain < 2/10   Patient to sleep in bed for 6-8 hours with LE symptoms <2/10   Oswestry to < 20% impaired  "

## 2025-01-20 ENCOUNTER — DOCUMENTATION (OUTPATIENT)
Dept: PHYSICAL THERAPY | Facility: CLINIC | Age: 80
End: 2025-01-20
Payer: MEDICARE

## 2025-01-20 ENCOUNTER — APPOINTMENT (OUTPATIENT)
Dept: PHYSICAL THERAPY | Facility: CLINIC | Age: 80
End: 2025-01-20
Payer: MEDICARE

## 2025-01-20 NOTE — PROGRESS NOTES
Physical Therapy                 Therapy Communication Note    Patient Name: Giovanna Flores  MRN: [id number]  Department:   Room: Room/bed info not found  Today's Date: 1/20/2025     Discipline: Physical Therapy    Missed Time: Cancel    Comment:

## 2025-01-24 DIAGNOSIS — M53.3 SACROILIAC JOINT PAIN: ICD-10-CM

## 2025-01-24 DIAGNOSIS — M54.16 LUMBAR RADICULOPATHY: ICD-10-CM

## 2025-01-24 RX ORDER — GABAPENTIN 100 MG/1
100 CAPSULE ORAL 2 TIMES DAILY
Qty: 60 CAPSULE | Refills: 0 | OUTPATIENT
Start: 2025-01-24

## 2025-01-30 NOTE — PROGRESS NOTES
"    Physical Therapy Treatment    Patient Name: Giovanna Flores  MRN: [id number]  Encounter date:  1/31/2025  Time Calculation  Start Time: 1030  Stop Time: 1115  Time Calculation (min): 45 min     PT Therapeutic Procedures Time Entry  Neuromuscular Re-Education Time Entry: 12  Therapeutic Exercise Time Entry: 28       Visit Number:  3 (including evaluation)  Planned total visits: 11  Visits Authorized/Insurance Coverage:  1/2/25: EVAL ONLY-Auth Rcvd 10 visits 12/23-3/17 for OP PT  12/13/2024: AUTH REQUIRED, 20.00 COPAY, 100% COVERAGE, 4500 OOP-NOT MET, [facility]      10/09/2024  M19.90 (ICD-10-CM) - Osteoarthritis, unspecified osteoarthritis type, unspecified site  M54.42,G89.29 (ICD-10-CM) - Chronic left-sided low back pain with left-sided sciatica    Current Problem  Problem List Items Addressed This Visit             ICD-10-CM    Osteoarthritis M19.90       Precautions:  HTN  Osteoporosis- no traction       Pain  0/10    Subjective  General   Patient continues to report chief complaint of bilateral foot paresthesia.  Patient denies LBP.  Patient compliant with HEP.    Objective  Good core control    Treatment:      Ther-Ex  NuStep L2 x 8'  Supine:  90/90 hamstring stretch 2x30\" ea  Figure 4 piriformis stretch push/pull 2x30\"ea  SKTC 2x30\" Ea  L1 clamshell x15 ea    Standing:  Band sidestep peach loop above knee 8'x  6   Airex beam tandem x 6  Airex beam sidestepping x 6    Foam pad  EO/EC 30\" ea  Nods/nos x 10  cone tap from foam x 10    RB AP/ML x 20    Hurdles FWD     Stir the pot 10# crossover symetry cord x 20 CW/CCW       Manual:  STM bilateral lumbar paraspinals, prone pillow under abdominals     Current HEP:  SK[facility]  DKREZA lawson    Has patient been compliant with HEP? Yes    Activity tolerance:  good    OP EDUCATION:  HEP    Assessment:  Pt's response to treatment:  good  Areas of improvements:  core strength  Limitations/deficits:  neuropathy feet    Pain end of session:   0-1/10    Plan:  Continue " with current POC with the following changes advance as able-orient to appropriate weight machines in gym.    Assessment of current progress against goals:  Progressing toward functional goals          Goals:  STG (3 visits)   Patient to be independent with HEP     LTG(11 visits)   Patient to perform ADLs with pain < 2/10   Patient to sleep in bed for 6-8 hours with LE symptoms <2/10   Oswestry to < 20% impaired

## 2025-01-31 ENCOUNTER — APPOINTMENT (OUTPATIENT)
Dept: PHYSICAL THERAPY | Facility: CLINIC | Age: 80
End: 2025-01-31
Payer: MEDICARE

## 2025-01-31 DIAGNOSIS — M19.90 OSTEOARTHRITIS, UNSPECIFIED OSTEOARTHRITIS TYPE, UNSPECIFIED SITE: ICD-10-CM

## 2025-01-31 PROCEDURE — 97110 THERAPEUTIC EXERCISES: CPT | Mod: GP

## 2025-02-03 ENCOUNTER — APPOINTMENT (OUTPATIENT)
Dept: RADIOLOGY | Facility: CLINIC | Age: 80
End: 2025-02-03
Payer: MEDICARE

## 2025-02-04 ENCOUNTER — APPOINTMENT (OUTPATIENT)
Dept: PHYSICAL THERAPY | Facility: CLINIC | Age: 80
End: 2025-02-04
Payer: MEDICARE

## 2025-02-04 NOTE — PROGRESS NOTES
"    Physical Therapy Treatment    Patient Name: Giovanna Flores  MRN: [id number]  Encounter date:  2/5/2025  Time Calculation  Start Time: 0930  Stop Time: 1015  Time Calculation (min): 45 min     PT Therapeutic Procedures Time Entry  Neuromuscular Re-Education Time Entry: 20  Therapeutic Exercise Time Entry: 25       Visit Number:  4 (including evaluation)  Planned total visits: 11  Visits Authorized/Insurance Coverage:  1/2/25: EVAL ONLY-Auth Rcvd 10 visits 12/23-3/17 for OP PT  12/13/2024: AUTH REQUIRED, 20.00 COPAY, 100% COVERAGE, 4500 OOP-NOT MET, [facility]      10/09/2024  M19.90 (ICD-10-CM) - Osteoarthritis, unspecified osteoarthritis type, unspecified site  M54.42,G89.29 (ICD-10-CM) - Chronic left-sided low back pain with left-sided sciatica    Current Problem  Problem List Items Addressed This Visit             ICD-10-CM    Osteoarthritis M19.90         Precautions:  HTN  Osteoporosis- no traction       Pain  0/10    Subjective  General   Patient reports good tolerance to last session, though as of yet not any overall changes as of yet with paraesthesias in feet.   Pt reports no current pain .     Objective  Pt ambulates with subtle decrease trunk rotation, no observed foot drop or flat feet at heel strike observed.      Treatment:      Ther-Ex  NuStep L2 x 8'  s= 7;  A=9     Supine:  90/90 hamstring stretch 2x30\" ea (with intermittent ankle pumps for nerve glide)  Figure 4 piriformis stretch push/pull 2x30\"ea  SKTC 2x30\" Ea  Supine, green band ,clamshell  2x10 ea    Standing:  Band sidestep green loop above knee 8'x  6     Neuromuscular re-ed  Airex beam tandem x 6  Airex beam sidestepping x 6  Airex beam tandem stance R and L 3 x 20\" each    Foam pad  EO/EC 30\" ea  Nods/nos x 10  Step tap from foam to 5\" solid step x 10 reps R and L      Deferred:  RB AP/ML x 20  Hurdles FWD  Stir the pot 10# crossover symetry cord x 20 CW/CCW       Manual: pt declined this date.   STM bilateral lumbar paraspinals, prone " pillow under abdominals     Current HEP:  [facility]  DELLA lawson    Has patient been compliant with HEP? Yes    Activity tolerance:  good    OP EDUCATION:  HEP    Assessment:  Pt's response to treatment:  good  Areas of improvements:  core and postural awareness during performance of balance activities.    Limitations/deficits:  residual neuropathy feet    Pain end of session:   0/10    Plan:  Continue with current POC with the following changes advance as able-orient to appropriate weight machines in gym.    Assessment of current progress against goals:  Progressing toward functional goals          Goals:  STG (3 visits)   Patient to be independent with HEP     LTG(11 visits)   Patient to perform ADLs with pain < 2/10   Patient to sleep in bed for 6-8 hours with LE symptoms <2/10   Oswestry to < 20% impaired

## 2025-02-05 ENCOUNTER — TREATMENT (OUTPATIENT)
Dept: PHYSICAL THERAPY | Facility: CLINIC | Age: 80
End: 2025-02-05
Payer: MEDICARE

## 2025-02-05 DIAGNOSIS — M19.90 OSTEOARTHRITIS, UNSPECIFIED OSTEOARTHRITIS TYPE, UNSPECIFIED SITE: ICD-10-CM

## 2025-02-05 PROCEDURE — 97112 NEUROMUSCULAR REEDUCATION: CPT | Mod: GP,CQ

## 2025-02-05 PROCEDURE — 97110 THERAPEUTIC EXERCISES: CPT | Mod: GP,CQ

## 2025-02-11 ENCOUNTER — APPOINTMENT (OUTPATIENT)
Dept: PHYSICAL THERAPY | Facility: CLINIC | Age: 80
End: 2025-02-11
Payer: MEDICARE

## 2025-02-13 ENCOUNTER — HOSPITAL ENCOUNTER (OUTPATIENT)
Dept: RADIOLOGY | Facility: CLINIC | Age: 80
Discharge: HOME | End: 2025-02-13
Payer: MEDICARE

## 2025-02-13 DIAGNOSIS — E04.1 THYROID NODULE: ICD-10-CM

## 2025-02-13 PROCEDURE — 76536 US EXAM OF HEAD AND NECK: CPT | Performed by: RADIOLOGY

## 2025-02-13 PROCEDURE — 76536 US EXAM OF HEAD AND NECK: CPT

## 2025-02-18 NOTE — PROGRESS NOTES
"    Physical Therapy Treatment    Patient Name: Giovanna Flores  MRN: [id number]  Encounter date:  2/19/2025  Time Calculation  Start Time: 0930  Stop Time: 1015  Time Calculation (min): 45 min     PT Therapeutic Procedures Time Entry  Manual Therapy Time Entry: 10  Therapeutic Exercise Time Entry: 35       Visit Number:  5 (including evaluation)  Planned total visits: 11  Visits Authorized/Insurance Coverage:  1/2/25: EVAL ONLY-Auth Rcvd 10 visits 12/23-3/17 for OP PT  12/13/2024: AUTH REQUIRED, 20.00 COPAY, 100% COVERAGE, 4500 OOP-NOT MET, [facility]      10/09/2024  M19.90 (ICD-10-CM) - Osteoarthritis, unspecified osteoarthritis type, unspecified site  M54.42,G89.29 (ICD-10-CM) - Chronic left-sided low back pain with left-sided sciatica    Current Problem  Problem List Items Addressed This Visit             ICD-10-CM    Osteoarthritis M19.90           Precautions:  HTN  Osteoporosis- no traction       Pain  3-4/10    Subjective  General   Pt reports initial good tolerance to last session, then experience some increase in L knee soreness and some LB muscle tightness, unsure why however suspects possibly the band resisted sidestepping as that was a new exercise.   Pt reports she is going to be researching joing a local gym under the silver sne[employer] program.      Objective  Pt ambulates with subtle decrease trunk rotation, no observed foot drop or flat feet at heel strike observed.      Treatment:      Ther-Ex  NuStep L2 x 7  s= 7;  A=9   Cybex knee flexion  DL   10#   2 x 10  Cybex knee extension DL 5#  2 x 10   Cybex hip abduction 10#  2 x 10  Cybex hip adduction 10#  2 x 10  Cybex seated row 5#  1 x 10   (Period of time spent educating pt on proper set up and performance of each machine      Deferred:  Supine:  90/90 hamstring stretch 2x30\" ea (with intermittent ankle pumps for nerve glide)  Figure 4 piriformis stretch push/pull 2x30\"ea  SKTC 2x30\" Ea  Supine, green band ,clamshell  2x10 ea  Standing:  Band sidestep " "green loop above knee 8'x  6    HELD       Manual:    STM bilateral lumbar paraspinals R > L,  prone with pillow under abdominals      Neuromuscular re-ed  DNP  Airex beam tandem x 6  Airex beam sidestepping x 6  Airex beam tandem stance R and L 3 x 20\" each    Foam pad  EO/EC 30\" ea  Nods/nos x 10  Step tap from foam to 5\" solid step x 10 reps R and L    Deferred:  RB AP/ML x 20  Hurdles FWD  Stir the pot 10# crossover symetry cord x 20 CW/CCW     [facility]  DELLA [facility]    Has patient been compliant with HEP? Yes    Activity tolerance:  good    OP EDUCATION:  Proper set up and performance of weight machines.     Assessment:  Pt's response to treatment:  good, less tightness of lumbar musculature post session  Areas of improvements:  knowledge of safe set up of weight machines.    Limitations/deficits:  chronic LBP;  R knee OA    Pain end of session:   0/10    Plan:  Continue with current POC with the following changes advance as, assess tolerance to weight machine performance.     Assessment of current progress against goals:  Progressing toward functional goals          Goals:  STG (3 visits)   Patient to be independent with HEP     LTG(11 visits)   Patient to perform ADLs with pain < 2/10   Patient to sleep in bed for 6-8 hours with LE symptoms <2/10   Oswestry to < 20% impaired  "

## 2025-02-19 ENCOUNTER — TREATMENT (OUTPATIENT)
Dept: PHYSICAL THERAPY | Facility: CLINIC | Age: 80
End: 2025-02-19
Payer: MEDICARE

## 2025-02-19 DIAGNOSIS — M19.90 OSTEOARTHRITIS, UNSPECIFIED OSTEOARTHRITIS TYPE, UNSPECIFIED SITE: ICD-10-CM

## 2025-02-19 PROCEDURE — 97110 THERAPEUTIC EXERCISES: CPT | Mod: GP,CQ

## 2025-02-19 PROCEDURE — 97140 MANUAL THERAPY 1/> REGIONS: CPT | Mod: GP,CQ

## 2025-02-23 ENCOUNTER — OFFICE VISIT (OUTPATIENT)
Dept: URGENT CARE | Age: 80
End: 2025-02-23
Payer: MEDICARE

## 2025-02-23 VITALS
WEIGHT: 135 LBS | BODY MASS INDEX: 23.05 KG/M2 | DIASTOLIC BLOOD PRESSURE: 76 MMHG | HEIGHT: 64 IN | SYSTOLIC BLOOD PRESSURE: 179 MMHG | OXYGEN SATURATION: 100 % | HEART RATE: 80 BPM | TEMPERATURE: 97.9 F | RESPIRATION RATE: 16 BRPM

## 2025-02-23 DIAGNOSIS — B96.89 ACUTE BACTERIAL SINUSITIS: Primary | ICD-10-CM

## 2025-02-23 DIAGNOSIS — J01.90 ACUTE BACTERIAL SINUSITIS: Primary | ICD-10-CM

## 2025-02-23 LAB — POC RAPID STREP: NEGATIVE

## 2025-02-23 PROCEDURE — 1123F ACP DISCUSS/DSCN MKR DOCD: CPT | Performed by: SURGERY

## 2025-02-23 PROCEDURE — 99203 OFFICE O/P NEW LOW 30 MIN: CPT | Performed by: SURGERY

## 2025-02-23 PROCEDURE — 3077F SYST BP >= 140 MM HG: CPT | Performed by: SURGERY

## 2025-02-23 PROCEDURE — 3078F DIAST BP <80 MM HG: CPT | Performed by: SURGERY

## 2025-02-23 PROCEDURE — 1159F MED LIST DOCD IN RCRD: CPT | Performed by: SURGERY

## 2025-02-23 PROCEDURE — 87880 STREP A ASSAY W/OPTIC: CPT | Performed by: SURGERY

## 2025-02-23 PROCEDURE — 1126F AMNT PAIN NOTED NONE PRSNT: CPT | Performed by: SURGERY

## 2025-02-23 PROCEDURE — 1036F TOBACCO NON-USER: CPT | Performed by: SURGERY

## 2025-02-23 PROCEDURE — 1160F RVW MEDS BY RX/DR IN RCRD: CPT | Performed by: SURGERY

## 2025-02-23 RX ORDER — DOXYCYCLINE 100 MG/1
100 CAPSULE ORAL 2 TIMES DAILY
Qty: 20 CAPSULE | Refills: 0 | Status: SHIPPED | OUTPATIENT
Start: 2025-02-23 | End: 2025-03-05

## 2025-02-23 ASSESSMENT — PAIN SCALES - GENERAL: PAINLEVEL_OUTOF10: 0-NO PAIN

## 2025-02-23 NOTE — PROGRESS NOTES
Chief Complaint   Patient presents with    Sore Throat     Patient states its been going on for 2 days       Physical Exam:     GEN: No acute distress    ENT: Bilateral TMs bulging with serous effusions, canals unremarkable, sinus congestion present. Frontal and maxillary sinus tender to finger tap. Pharynx and tonsils mildly hyperemic but without exudate.     Resp: Lungs clear to auscultation bilaterally       POC Labs:     Office Visit on 02/23/2025   Component Date Value Ref Range Status    POC Rapid Strep 02/23/2025 Negative  Negative Final        Encounter Diagnosis   Name Primary?    Acute bacterial sinusitis Yes        Medical Decision Making & Plan:     Doxycycline      02/23/25 at 8:34 AM - Neelam Felder, DO

## 2025-02-26 NOTE — PROGRESS NOTES
"    Physical Therapy Treatment    Patient Name: Giovanna Flores  MRN: [id number]  Encounter date:  2/27/2025  Time Calculation  Start Time: 0800  Stop Time: 0845  Time Calculation (min): 45 min     PT Therapeutic Procedures Time Entry  Neuromuscular Re-Education Time Entry: 12  Therapeutic Exercise Time Entry: 30       Visit Number:  6 (including evaluation)  Planned total visits: 11  Visits Authorized/Insurance Coverage:  1/2/25: EVAL ONLY-Auth Rcvd 10 visits 12/23-3/17 for OP PT    Current Problem  Problem List Items Addressed This Visit             ICD-10-CM    Osteoarthritis - Primary M19.90       Precautions:  HTN  Osteoporosis- no traction          Pain  Presently   0/10        Subjective  General  Patient in good spirits and motivated to participate.   Patient compliant with HEP; considering joining community gym.  Patient with variable levels of paresthesia bilateral feet.    Objective  Good core control    Treatment:    Ther-Ex  NuStep L2 x 7  s= 7;  A=9   Cybex knee flexion  DL   10#   2 x 10  Cybex knee extension DL 5#  2 x 10 - DNP  Cybex hip abduction 10#  2 x 10  Cybex hip adduction 10#  2 x 10  Cybex seated row 5#  1 x 10   (Period of time spent educating pt on proper set up and performance of each machine)        Deferred:  Supine:  90/90 hamstring stretch 2x30\" ea (with intermittent ankle pumps for nerve glide)  Figure 4 piriformis stretch push/pull 2x30\"ea  SKTC 2x30\" Ea  Supine, green band ,clamshell  2x10 ea  Standing:  Band sidestep green loop above knee 8'x  6    HELD         Manual: - DNP  STM bilateral lumbar paraspinals R > L,  prone with pillow under abdominals    Neuromuscular re-ed    Airex beam tandem x 6  Airex beam sidestepping x 6  Airex beam tandem stance R and L 3 x 20\" each      Foam pad  EO/EC 30\" ea  Nods/nos x 10  Step tap from foam to 5\" solid step x 10 reps R and L    Cone slolum x 6     Deferred:  RB AP/ML x 20  Hurdles FWD  Stir the pot 10# crossover symetry cord x 20 CW/CCW   "   [facility]       Has patient been compliant with HEP? Yes    Activity tolerance:  good    OP EDUCATION:  good    Assessment:  Pt's response to treatment:  good  Areas of improvements:  LBP  Limitations/deficits:  neuropathy    Pain end of session:   Modest LBP    Plan:  Continue with current POC with the following changes advance as per tolerance    Assessment of current progress against goals:  Progressing toward functional goals            Goals:  STG (3 visits)   Patient to be independent with HEP     LTG(11 visits)   Patient to perform ADLs with pain < 2/10   Patient to sleep in bed for 6-8 hours with LE symptoms <2/10   Oswestry to < 20% impaired

## 2025-02-27 ENCOUNTER — APPOINTMENT (OUTPATIENT)
Dept: PHYSICAL THERAPY | Facility: CLINIC | Age: 80
End: 2025-02-27
Payer: MEDICARE

## 2025-02-27 DIAGNOSIS — M19.90 OSTEOARTHRITIS, UNSPECIFIED OSTEOARTHRITIS TYPE, UNSPECIFIED SITE: Primary | ICD-10-CM

## 2025-02-27 PROCEDURE — 97112 NEUROMUSCULAR REEDUCATION: CPT | Mod: GP

## 2025-02-27 PROCEDURE — 97110 THERAPEUTIC EXERCISES: CPT | Mod: GP

## 2025-03-04 ENCOUNTER — APPOINTMENT (OUTPATIENT)
Dept: PHYSICAL THERAPY | Facility: CLINIC | Age: 80
End: 2025-03-04
Payer: MEDICARE

## 2025-03-04 NOTE — PROGRESS NOTES
"    Physical Therapy Treatment    Patient Name: Giovanna Flores  MRN: [id number]  Encounter date:  3/5/2025  Time Calculation  Start Time: 0930  Stop Time: 1013  Time Calculation (min): 43 min     PT Therapeutic Procedures Time Entry  Neuromuscular Re-Education Time Entry: 10  Therapeutic Exercise Time Entry: 30       Visit Number:  7 (including evaluation)  Planned total visits: 11  Visits Authorized/Insurance Coverage:  1/2/25: EVAL ONLY-Auth Rcvd 10 visits 12/23-3/17 for OP PT  12/13/2024: AUTH REQUIRED, 20.00 COPAY, 100% COVERAGE, 4500 OOP-NOT MET, [facility]         Current Problem  Problem List Items Addressed This Visit             ICD-10-CM    Osteoarthritis - Primary M19.90         Precautions:  HTN  Osteoporosis- no traction          Pain  No specific pain today       Subjective  General  Patient in good spirits and motivated to participate.  Patient diligent with HEP.  Patient without specific changes in neuropathy type symptoms.    Objective  Modest balance disturbance eyes closed      Treatment:    Ther-Ex  NuStep L2 x 7  s= 7;  A=7  Cybex knee flexion  DL   10#   2 x 10  Cybex knee extension DL 5#  2 x 10 - DNP  Cybex hip abduction 10#  2 x 10  Cybex hip adduction 10#  2 x 10  Cybex seated row 5#  2 x 10   (Period of time spent educating pt on proper set up and performance of each machine)        Deferred:  Supine:  90/90 hamstring stretch 2x30\" ea (with intermittent ankle pumps for nerve glide)  Figure 4 piriformis stretch push/pull 2x30\"ea  SKTC 2x30\" Ea  Supine, green band ,clamshell  2x10 ea  Standing:  Band sidestep green loop above knee 8'x  6    HELD         Manual: - DNP  STM bilateral lumbar paraspinals R > L,  prone with pillow under abdominals     Neuromuscular re-ed    Airex beam tandem x 6  Airex beam sidestepping x 6  Airex beam tandem stance R and L 3 x 20\" each        Foam pad  EO/EC 30\" ea  Nods/nos x 10  Step tap from foam to cone x 10 reps R and L     Cone slolum x 6     Deferred:  RB " AP/ML x 20  Hurdles FWD  Stir the pot 10# crossover symetry cord x 20 CW/CCW       [facility]    Has patient been compliant with HEP? Yes    Activity tolerance:  good    OP EDUCATION:  HEP    Assessment:  Pt's response to treatment:  good  Areas of improvements:  exercise tolerance  Limitations/deficits:  neuropathy type symptoms feet    Pain end of session:   No pain reported    Plan:  Continue with current POC with the following changes advance as able    Assessment of current progress against goals:  Progressing toward functional goals          Goals:  STG (3 visits)   Patient to be independent with HEP     LTG(11 visits)   Patient to perform ADLs with pain < 2/10   Patient to sleep in bed for 6-8 hours with LE symptoms <2/10   Oswestry to < 20% impaired

## 2025-03-05 ENCOUNTER — TREATMENT (OUTPATIENT)
Dept: PHYSICAL THERAPY | Facility: CLINIC | Age: 80
End: 2025-03-05
Payer: MEDICARE

## 2025-03-05 DIAGNOSIS — M19.90 OSTEOARTHRITIS, UNSPECIFIED OSTEOARTHRITIS TYPE, UNSPECIFIED SITE: Primary | ICD-10-CM

## 2025-03-05 PROCEDURE — 97112 NEUROMUSCULAR REEDUCATION: CPT | Mod: GP

## 2025-03-05 PROCEDURE — 97110 THERAPEUTIC EXERCISES: CPT | Mod: GP

## 2025-03-07 ENCOUNTER — TREATMENT (OUTPATIENT)
Dept: PHYSICAL THERAPY | Facility: CLINIC | Age: 80
End: 2025-03-07
Payer: MEDICARE

## 2025-03-07 DIAGNOSIS — M19.90 OSTEOARTHRITIS, UNSPECIFIED OSTEOARTHRITIS TYPE, UNSPECIFIED SITE: Primary | ICD-10-CM

## 2025-03-07 PROCEDURE — 97110 THERAPEUTIC EXERCISES: CPT | Mod: GP

## 2025-03-07 PROCEDURE — 97112 NEUROMUSCULAR REEDUCATION: CPT | Mod: GP

## 2025-03-07 NOTE — PROGRESS NOTES
"    Physical Therapy Treatment    Patient Name: Giovanna Flores  MRN: [id number]  Encounter date:  3/7/2025  Time Calculation  Start Time: 0810  Stop Time: 0853  Time Calculation (min): 43 min     PT Therapeutic Procedures Time Entry  Neuromuscular Re-Education Time Entry: 10  Therapeutic Exercise Time Entry: 30       Visit Number:  8 (including evaluation)  Planned total visits: 11  Visits Authorized/Insurance Coverage:  1/2/25: EVAL ONLY-Auth Rcvd 10 visits 12/23-3/17 for OP PT  12/13/2024: AUTH REQUIRED, 20.00 COPAY, 100% COVERAGE, 4500 OOP-NOT MET, [facility]        Current Problem  Problem List Items Addressed This Visit             ICD-10-CM    Osteoarthritis - Primary M19.90     Precautions:  HTN  Osteoporosis- no traction          Pain  0/10       Subjective  General  Patient reports 50 minutes on stationary bike.  Patient denies with good toleracne of PREs.     Objective  Modest LOB with EC    Treatment:    Ther-Ex  NuStep L2 x 7  s= 7;  A=7  Cybex knee flexion  DL   10#   2 x 10  Cybex knee extension DL 5#  2 x 10 - DNP  Cybex hip abduction 10#  2 x 10  Cybex hip adduction 10#  2 x 10  Cybex seated row 5#  2 x 10   (Period of time spent educating pt on proper set up and performance of each machine)          Manual: - DNP  STM bilateral lumbar paraspinals R > L,  prone with pillow under abdominals     Neuromuscular re-ed    Airex beam tandem x 6  Airex beam sidestepping x 6  Airex beam tandem stance R and L 3 x 20\" each     Foam pad  EO/EC 30\" ea  Nods/nos x 10  Step tap from foam to cone x 10 reps R and L     Cone slolum x 6     Deferred:  RB AP/ML x 20  Hurdles FWD  Stir the pot 10# crossover symetry cord x 20 CW/CCW       Current HEP:  SKTC  LORENZA Zazueta    Has patient been compliant with HEP? Yes    Activity tolerance:  good    OP EDUCATION:     Assessment:  Pt's response to treatment:  good  Areas of improvements:  endurance/core srtength  Limitations/deficits:  balance    Pain end of session: "   No  changes    Plan:  Continue with current POC with the following changes advance as able    Assessment of current progress against goals:  Progressing toward functional goals        Goals:  STG (3 visits)   Patient to be independent with HEP     LTG(11 visits)   Patient to perform ADLs with pain < 2/10   Patient to sleep in bed for 6-8 hours with LE symptoms <2/10   Oswestry to < 20% impaired

## 2025-03-11 ENCOUNTER — APPOINTMENT (OUTPATIENT)
Dept: PHYSICAL THERAPY | Facility: CLINIC | Age: 80
End: 2025-03-11
Payer: MEDICARE

## 2025-03-11 NOTE — PROGRESS NOTES
"    Physical Therapy Treatment    Patient Name: Giovanna Flores  MRN: [id number]  Encounter date:  3/12/2025  Time Calculation  Start Time: 0930  Stop Time: 1015  Time Calculation (min): 45 min     PT Therapeutic Procedures Time Entry  Neuromuscular Re-Education Time Entry: 10  Therapeutic Exercise Time Entry: 30       Visit Number:  9 (including evaluation)  Planned total visits: 11  Visits Authorized/Insurance Coverage:  1/2/25: EVAL ONLY-Auth Rcvd 10 visits 12/23-3/17 for OP PT  12/13/2024: AUTH REQUIRED, 20.00 COPAY, 100% COVERAGE, 4500 OOP-NOT MET, [facility]        Current Problem  Problem List Items Addressed This Visit             ICD-10-CM    Osteoarthritis M19.90       Precautions:  HTN  Osteoporosis- no traction       Pain  No gross changes;  modest LBP    Subjective  General  Patient in good spirits and motivated to participate    Objective  Good correction of LOB when perturbed        Treatment:    Ther-Ex  NuStep L2 x 8  s= 7;  A=7  Cybex knee flexion  DL   10#   2 x 10  Cybex knee extension DL 5#  2 x 10 - DNP  Cybex hip abduction 10#  2 x 10  Cybex hip adduction 10#  2 x 10  Cybex seated row 5#  2 x 10   (Period of time spent educating pt on proper set up and performance of each machine)           Manual: - DNP  STM bilateral lumbar paraspinals R > L,  prone with pillow under abdominals     Neuromuscular re-ed    Airex beam tandem x 6  Airex beam sidestepping x 6  Airex beam tandem stance R and L 3 x 20\" each     Foam pad  EO/EC 30\" ea  Nods/nos x 10  Step tap from foam to cone x 10 reps R and L  Hurdles FWD     Cone slolum x 6     Deferred:  RB AP/ML x 20  Stir the pot 10# crossover symetry cord x 20 CW/CCW       Current HEP:  SKTC  RODDYTC  DELLA Zazueta       Has patient been compliant with HEP? Yes    Activity tolerance:  good    OP EDUCATION:  HEP    Assessment:  Pt's response to treatment:  good  Areas of improvements:  balance/endurance  Limitations/deficits:  neuropathy of feet    Pain end of " session:   No changes    Plan  Continue with current POC with the following changes advance as able    Assessment of current progress against goals:  Progressing toward functional goals        Goals:  STG (3 visits)   Patient to be independent with HEP     LTG(11 visits)   Patient to perform ADLs with pain < 2/10   Patient to sleep in bed for 6-8 hours with LE symptoms <2/10   Oswestry to < 20% impaired

## 2025-03-12 ENCOUNTER — TREATMENT (OUTPATIENT)
Dept: PHYSICAL THERAPY | Facility: CLINIC | Age: 80
End: 2025-03-12
Payer: MEDICARE

## 2025-03-12 DIAGNOSIS — M19.90 OSTEOARTHRITIS, UNSPECIFIED OSTEOARTHRITIS TYPE, UNSPECIFIED SITE: ICD-10-CM

## 2025-03-12 PROCEDURE — 97112 NEUROMUSCULAR REEDUCATION: CPT | Mod: GP

## 2025-03-12 PROCEDURE — 97110 THERAPEUTIC EXERCISES: CPT | Mod: GP

## 2025-03-13 NOTE — PROGRESS NOTES
"    Physical Therapy Treatment    Patient Name: Giovanna Flores  MRN: [id number]  Encounter date:  3/14/2025  Time Calculation  Start Time: 0815  Stop Time: 0900  Time Calculation (min): 45 min     PT Therapeutic Procedures Time Entry  Neuromuscular Re-Education Time Entry: 10  Therapeutic Exercise Time Entry: 30       Visit Number:  10 (including evaluation)  Planned total visits: 11  Visits Authorized/Insurance Coverage:  1/2/25: EVAL ONLY-Auth Rcvd 10 visits 12/23-3/17 for OP PT  12/13/2024: AUTH REQUIRED, 20.00 COPAY, 100% COVERAGE, 4500 OOP-NOT MET, [facility]     Current Problem  Problem List Items Addressed This Visit             ICD-10-CM    Osteoarthritis - Primary M19.90         Precautions:  HTN  Osteoporosis- no traction     Pain  0/10    Subjective  General  Patient reports preparedness for discharge with HEP.  Patient to pursue community gym membership to continue rehab efforts.  Patient continues to report variable LE neuropathy type symptoms when lying down.    Objective  Oswestry  8/50      Treatment:    Ther-Ex  NuStep L2 x 8  s= 7;  A=7  Cybex knee flexion  DL   10#   2 x 10  Cybex knee extension DL 5#  2 x 10 - DNP  Cybex hip abduction 10#  2 x 10  Cybex hip adduction 10#  2 x 10  Cybex seated row 5#  2 x 10   (Period of time spent educating pt on proper set up and performance of each machine)           Manual: - DNP  STM bilateral lumbar paraspinals R > L,  prone with pillow under abdominals     Neuromuscular re-ed    Airex beam tandem x 6  Airex beam sidestepping x 6  Airex beam tandem stance R and L 3 x 20\" each     Foam pad  EO/EC 30\" ea  Nods/nos x 10  Step tap from foam to cone x 10 reps R and L  Hurdles FWD     Cone slolum x 6     Deferred:  RB AP/ML x 20  Stir the pot 10# crossover symetry cord x 20 CW/CCW       Current HEP:  SKTC  TC  DELLA Zazueta        Has patient been compliant with HEP? Yes    Activity tolerance:  good    OP EDUCATION:  HEP    Assessment:  Pt's response to " treatment:  good  Areas of improvements:  balance/exercise tolerance  Limitations/deficits:  distal LE neuropathy    Pain end of session:   0/10    Plan:  Discharge    Assessment of current progress against goals:  Functionally independent, goals met:  Date met 3/14        Goals:  STG (3 visits)   Patient to be independent with HEP- MET     LTG(11 visits)   Patient to perform ADLs with pain < 2/10- HEP   Patient to sleep in bed for 6-8 hours with LE symptoms <2/10- MET   Oswestry to < 20% impaired-MET

## 2025-03-14 ENCOUNTER — TREATMENT (OUTPATIENT)
Dept: PHYSICAL THERAPY | Facility: CLINIC | Age: 80
End: 2025-03-14
Payer: MEDICARE

## 2025-03-14 DIAGNOSIS — M19.90 OSTEOARTHRITIS, UNSPECIFIED OSTEOARTHRITIS TYPE, UNSPECIFIED SITE: Primary | ICD-10-CM

## 2025-03-14 PROCEDURE — 97112 NEUROMUSCULAR REEDUCATION: CPT | Mod: GP

## 2025-03-14 PROCEDURE — 97110 THERAPEUTIC EXERCISES: CPT | Mod: GP

## 2025-03-18 ENCOUNTER — APPOINTMENT (OUTPATIENT)
Dept: PHYSICAL THERAPY | Facility: CLINIC | Age: 80
End: 2025-03-18
Payer: MEDICARE

## 2025-03-25 ENCOUNTER — APPOINTMENT (OUTPATIENT)
Dept: PHYSICAL THERAPY | Facility: CLINIC | Age: 80
End: 2025-03-25
Payer: MEDICARE

## 2025-05-12 ENCOUNTER — OFFICE VISIT (OUTPATIENT)
Dept: PRIMARY CARE | Facility: CLINIC | Age: 80
End: 2025-05-12
Payer: MEDICARE

## 2025-05-12 VITALS
BODY MASS INDEX: 22.36 KG/M2 | SYSTOLIC BLOOD PRESSURE: 128 MMHG | OXYGEN SATURATION: 97 % | HEART RATE: 82 BPM | WEIGHT: 131 LBS | DIASTOLIC BLOOD PRESSURE: 78 MMHG | HEIGHT: 64 IN

## 2025-05-12 DIAGNOSIS — H61.23 BILATERAL IMPACTED CERUMEN: ICD-10-CM

## 2025-05-12 DIAGNOSIS — Z12.11 SCREENING FOR MALIGNANT NEOPLASM OF COLON: ICD-10-CM

## 2025-05-12 DIAGNOSIS — J30.1 ALLERGIC RHINITIS DUE TO POLLEN, UNSPECIFIED SEASONALITY: Primary | ICD-10-CM

## 2025-05-12 PROCEDURE — 1036F TOBACCO NON-USER: CPT | Performed by: FAMILY MEDICINE

## 2025-05-12 PROCEDURE — 1160F RVW MEDS BY RX/DR IN RCRD: CPT | Performed by: FAMILY MEDICINE

## 2025-05-12 PROCEDURE — 99213 OFFICE O/P EST LOW 20 MIN: CPT | Performed by: FAMILY MEDICINE

## 2025-05-12 PROCEDURE — 1158F ADVNC CARE PLAN TLK DOCD: CPT | Performed by: FAMILY MEDICINE

## 2025-05-12 PROCEDURE — 1159F MED LIST DOCD IN RCRD: CPT | Performed by: FAMILY MEDICINE

## 2025-05-12 PROCEDURE — 3078F DIAST BP <80 MM HG: CPT | Performed by: FAMILY MEDICINE

## 2025-05-12 PROCEDURE — 3074F SYST BP LT 130 MM HG: CPT | Performed by: FAMILY MEDICINE

## 2025-05-12 PROCEDURE — G2211 COMPLEX E/M VISIT ADD ON: HCPCS | Performed by: FAMILY MEDICINE

## 2025-05-12 PROCEDURE — 1126F AMNT PAIN NOTED NONE PRSNT: CPT | Performed by: FAMILY MEDICINE

## 2025-05-12 RX ORDER — AZELASTINE 1 MG/ML
1 SPRAY, METERED NASAL 2 TIMES DAILY
Qty: 30 ML | Refills: 1 | Status: SHIPPED | OUTPATIENT
Start: 2025-05-12 | End: 2026-05-12

## 2025-05-12 ASSESSMENT — ENCOUNTER SYMPTOMS
HEADACHES: 0
CHILLS: 0
SINUS PAIN: 1
WHEEZING: 0
VOMITING: 0
SORE THROAT: 0
COUGH: 0
RHINORRHEA: 1
SHORTNESS OF BREATH: 0
FATIGUE: 1
CONSTIPATION: 0
NAUSEA: 0
DIZZINESS: 0
ABDOMINAL PAIN: 0
SWOLLEN GLANDS: 0
MYALGIAS: 0
FEVER: 0
DIARRHEA: 0
PALPITATIONS: 0

## 2025-05-12 ASSESSMENT — PAIN SCALES - GENERAL: PAINLEVEL_OUTOF10: 0-NO PAIN

## 2025-05-12 NOTE — PROGRESS NOTES
"Subjective   Patient ID: Giovanna Flores is a 80 y.o. female who presents for URI.    Seen about 1 months ago at  - was given an antibiotic - still with a blocked feeling in her ears L>R    Bilateral cerumen impaction  - h/o ear wax removal with Dr Blakely    URI   This is a recurrent problem. The current episode started 1 to 4 weeks ago. The problem has been waxing and waning. There has been no fever. Associated symptoms include congestion, a plugged ear sensation, rhinorrhea and sinus pain. Pertinent negatives include no abdominal pain, chest pain, coughing, diarrhea, ear pain, headaches, nausea, rash, sneezing, sore throat, swollen glands, vomiting or wheezing.        Review of Systems   Constitutional:  Positive for fatigue. Negative for chills and fever.   HENT:  Positive for congestion, rhinorrhea and sinus pain. Negative for ear pain, postnasal drip, sneezing, sore throat and tinnitus.    Respiratory:  Negative for cough, shortness of breath and wheezing.    Cardiovascular:  Negative for chest pain, palpitations and leg swelling.   Gastrointestinal:  Negative for abdominal pain, constipation, diarrhea, nausea and vomiting.   Musculoskeletal:  Negative for myalgias.   Skin:  Negative for rash.   Neurological:  Negative for dizziness and headaches.       Objective   /78   Pulse 82   Ht 1.626 m (5' 4\")   Wt 59.4 kg (131 lb)   SpO2 97%   BMI 22.49 kg/m²     Physical Exam  Vitals reviewed.   Constitutional:       General: She is not in acute distress.     Appearance: Normal appearance. She is not ill-appearing.   HENT:      Right Ear: There is no impacted cerumen.      Left Ear: There is no impacted cerumen.      Nose: Congestion and rhinorrhea present.      Mouth/Throat:      Pharynx: No oropharyngeal exudate or posterior oropharyngeal erythema.   Eyes:      General:         Right eye: No discharge.         Left eye: No discharge.   Cardiovascular:      Rate and Rhythm: Normal rate and regular rhythm. "      Heart sounds: Normal heart sounds. No murmur heard.  Pulmonary:      Breath sounds: Normal breath sounds. No wheezing or rhonchi.   Musculoskeletal:      Right lower leg: No edema.      Left lower leg: No edema.   Skin:     Findings: No rash.   Neurological:      General: No focal deficit present.      Mental Status: She is alert and oriented to person, place, and time.   Psychiatric:         Mood and Affect: Mood normal.         Behavior: Behavior normal.         Assessment/Plan   Problem List Items Addressed This Visit    None  Visit Diagnoses         Codes      Allergic rhinitis due to pollen, unspecified seasonality    -  Primary J30.1    nasal saline rinses and astepro     Relevant Medications    azelastine (Astelin) 137 mcg (0.1 %) nasal spray      Screening for malignant neoplasm of colon     Z12.11    cologuard ordered     Relevant Orders    Cologuard® colon cancer screening      Bilateral impacted cerumen     H61.23    to see Dr Blakely

## 2025-05-29 ENCOUNTER — APPOINTMENT (OUTPATIENT)
Dept: OTOLARYNGOLOGY | Facility: CLINIC | Age: 80
End: 2025-05-29
Payer: MEDICARE

## 2025-05-29 VITALS — WEIGHT: 133 LBS | HEIGHT: 63 IN | BODY MASS INDEX: 23.57 KG/M2

## 2025-05-29 DIAGNOSIS — H61.23 BILATERAL IMPACTED CERUMEN: Primary | ICD-10-CM

## 2025-05-29 DIAGNOSIS — E04.1 LEFT THYROID NODULE: ICD-10-CM

## 2025-05-29 DIAGNOSIS — E04.2 NONTOXIC MULTINODULAR GOITER: ICD-10-CM

## 2025-05-29 DIAGNOSIS — E04.1 THYROID NODULE: ICD-10-CM

## 2025-05-29 DIAGNOSIS — H90.0 CONDUCTIVE HEARING LOSS, BILATERAL: ICD-10-CM

## 2025-05-29 NOTE — PROGRESS NOTES
Chief Complaint   Patient presents with    Cerumen Impaction     LOV: 1/2024 EAR CLEANING      HPI:  Giovanna Flores is a 80 y.o. female presents today with increased hearing loss and some ear pressure.  History of some wax impactions.  Feels like it is bad again.  H/O a dominant left-sided thyroid nodule.    She had benign ultrasound-guided fine-needle aspiration biopsy in February 2024.    Repeat ultrasound in February 2025 showed no significant change.    Following clinically with serial ultrasounds   no prior thyroid history.  No sore throat, hoarseness, dysphagia.  CT also shows some asymmetry in the right fossa of Rosenmuller.  --  2/7/2024 FNA:  Final Cytological Interpretation      A. THYROID FINE NEEDLE ASPIRATION LEFT MID LOBE   Adequacy: Satisfactory for evaluation                  Interpretation and diagnosis:  Benign (The [place] system category II)  Cytologic findings consistent with a benign follicular nodule      --  US THYROID; 2/13/2025      FINDINGS:  Right and left thyroid lobes are normal in size.      Right lobe of the thyroid: 4.7 cm x 1.4 cm x 1.8 cm.  In the upper pole, there is a ill-defined 0.3 x 0.6 x 0.8 cm wider  than tall hypoechoic solid nodule (TR 4: Moderately suspicious). In  midpole, a smoothly marginated wider than tall hypoechoic solid  nodule measuring 0.6 x 0.9 x 0.9 cm is present (TR 4: Moderately  suspicious). Previously this measured up to 0.8 cm. In the lower  pole, there is a smoothly marginated wider than tall isoechoic solid  nodule measuring 1.0 x 1.3 x 1.3 cm (TR 3: Mildly suspicious).  Previously this measured up to 1.2 cm.      Left lobe of the thyroid: 5.0 cm x 2.0 cm x 1.8 cm.  In the midpole, a wider than tall smoothly marginated hypoechoic  solid nodule measuring 0.9 x 1.1 x 1.2 cm is seen (TR 4: Moderately  suspicious). Previously this measured up to 0.9 cm. In the mid to  lower pole, a wider than tall smoothly marginated isoechoic solid  nodule measuring 1.8 x  "2.0 x 2.5 cm in size is seen (TR 3: Mildly  suspicious) previously this measured up to 2.3 cm.      Thyroid isthmus: 0.3 cm. Unremarkable.      IMPRESSION:  There is a new 0.8 cm TR 4 nodule in upper pole of right thyroid lobe  requiring no follow-up.      The remaining thyroid nodules are stable. However, the dominant 2.5  cm TR 3 nodule within the left thyroid lobe would again be  recommended for FNA if not already performed.    PMH:  Past Medical History:   Diagnosis Date    Hypertension      History reviewed. No pertinent surgical history.      Medications:     Current Outpatient Medications:     amLODIPine (Norvasc) 5 mg tablet, Take 1 tablet (5 mg) by mouth once daily., Disp: 90 tablet, Rfl: 3    atorvastatin (Lipitor) 10 mg tablet, Take 1 tablet (10 mg) by mouth once daily., Disp: 90 tablet, Rfl: 3    cholecalciferol (Vitamin D-3) 50 mcg (2,000 unit) capsule, Take 2 capsules (100 mcg) by mouth early in the morning.., Disp: , Rfl:     glucosam/chond-msm1/C/lina/bor (GLUCOSAMINE-CHOND-MSM COMPLEX ORAL), Take by mouth., Disp: , Rfl:     hydroCHLOROthiazide (Microzide) 12.5 mg tablet, Take 1 tablet (12.5 mg) by mouth once daily in the morning. Take before meals., Disp: 90 tablet, Rfl: 3    azelastine (Astelin) 137 mcg (0.1 %) nasal spray, Administer 1 spray into each nostril 2 times a day. Use in each nostril as directed, Disp: 30 mL, Rfl: 1     Allergies:  Allergies   Allergen Reactions    Erythromycin Unknown    Penicillins Unknown    Azithromycin Palpitations        ROS:  Review of systems normal unless stated otherwise in the HPI and/or PMH.    Physical Exam:  Height 1.6 m (5' 3\"), weight 60.3 kg (133 lb). Body mass index is 23.56 kg/m².     GENERAL APPEARANCE: Well developed and well nourished.  Alert and oriented in no acute distress.  Normal vocal quality.      HEAD/FACE: No erythema or edema or facial tenderness.  Normal facial nerve function bilaterally.    EAR:       EXTERNAL: Normal pinnas and " bilateral obstructive cerumen impaction was removed by myself with instrumentation using the operating microscope.  Normal pinnas and external auditory canals after cleaning.  Cleaned with suction and microscope and alligators post failure flushing.       MIDDLE EAR: Tympanic membranes intact and mobile with normal landmarks.  Middle ear space appears well aerated.       TUBE STATUS: N/A       MASTOID CAVITY: N/A       HEARING: Gross hearing assessment is within normal limits.      NOSE:       VISUALIZED USING: Anterior rhinoscopy with headlight and nasal speculum.       DORSUM: Midline, nontraumatic appearance.       MUCOSA: Normal-appearing.       SECRETIONS: Normal.       SEPTUM: Midline and nonobstructing.       INFERIOR TURBINATES: Normal.       MIDDLE TURBINATES/MEATUS: N/A       BLEEDING: N/A         ORAL CAVITY/PHARYNX:       TEETH: Adequate dentition.       TONGUE: No mass or lesion.  Normal mobility.       FLOOR OF MOUTH: No mass or lesion.       PALATE: Normal hard palate, soft palate, and uvula.       OROPHARYNX: Normal without mass or lesion.       BUCCAL MUCOSA/GBS: Normal without mass or lesion.       LIPS: Normal.    LARYNX/HYPOPHARYNX/NASOPHARYNX: N/A    NECK: No palpable masses or abnormal adenopathy.  Trachea is midline.  Prominent right carotid bulb.    THYROID: Palpable nodule with swallowing left side.    SALIVARY GLANDS: Normal bilateral parotid and submandibular glands by inspection and palpation.    TMJ's: Normal.    NEURO: Cranial nerve exam grossly normal bilaterally.       Assessment/Plan   Giovanna was seen today for cerumen impaction.  Diagnoses and all orders for this visit:  Bilateral impacted cerumen (Primary)  Conductive hearing loss, bilateral  Left thyroid nodule  Nontoxic multinodular goiter  Thyroid nodule  -     US thyroid; Future    Ears cleaned wax.  This should help.  Thyroid stable.  Recheck ultrasound next February.  Follow up for test results after testing is complete.      Alberto Blakely MD

## 2025-06-13 ENCOUNTER — APPOINTMENT (OUTPATIENT)
Dept: RADIOLOGY | Facility: HOSPITAL | Age: 80
End: 2025-06-13
Payer: MEDICARE

## 2025-06-23 ENCOUNTER — APPOINTMENT (OUTPATIENT)
Dept: RADIOLOGY | Facility: HOSPITAL | Age: 80
End: 2025-06-23
Payer: MEDICARE

## 2025-07-01 ENCOUNTER — PATIENT OUTREACH (OUTPATIENT)
Facility: CLINIC | Age: 80
End: 2025-07-01
Payer: MEDICARE

## 2025-07-01 NOTE — PROGRESS NOTES
Discharge Facility: [facility]     Discharge Diagnosis:    Closed fracture of proximal end of left humerus, unspecified fracture morphology, sequela - Primary    Fall, initial encounter    Other closed displaced fracture of proximal end of left humerus, initial encounter    Closed fracture of distal end of left radius, unspecified fracture morphology, initial encounter    Hematoma   Contusion of unspecified site    Acute post-operative pain      Closed fracture of proximal end of left humerus, unspecified fracture morphology, sequela      Admission Date:  6/22/2025   Discharge Date:  6/30/2025     PCP Appointment Date: Tasked to office     Specialist Appointment Date:     [facility]   Agency [facility]   Phone# [phone]   Start of Care -- SOC 24-48         Follow-up with Dr. Moore's nurse in office in 2-3 weeks - this may be a virtual visit if you are discharged to a nursing facility.     Office Phone Number (Monday-Friday, 8AM-5PM):  [phone]    After Hours Phone Number (Weekdays After 5PM and Weekends):  [phone]    [facility]  [address]  [place] [id number]     [place]  [address], [address]  [place] [address]     Patient will remain in the shoulder immobilizer. They will keep thier postoperative dressing on until first follow up in clinic. They will follow up with Dr. Moore's team in 2 weeks for dressing change, wound check, repeat xrays of left shoulder and left wrist and pin site cleaning instructions. She can then present to Dr. Moore's office again 7 weeks post op with repeat xrays once again. If healing is appropriate will plan to remove the external fixator 8 weeks post op.   0-2 weeks: active ROM to left shoulder up to 45-60 degrees, passive ROM up to 90 degrees. No lifting pushing or pulling > 1 lb x 8 weeks.   2-7 weeks: full active and passive ROM to left shoulder up to 90 degrees but no lifting pushing or pulling > 1 lb x 8  weeks.  0-7 weeks: ok for full active and passive ROM to elbow, wrist and hand.        Hospital Encounter and Summary Linked: Yes      Hospital Encounter with Isabella Avila MD; Fermín Serrano MD      See discharge assessment below for further details     Wrap Up  Wrap Up Additional Comments: Patient aware of DC summary instructions, PCP office to call and tre F/U , Patient aware of med changes, has family support. Patient aware and has number to [facility] to call for update today. Patient encouraged  to call providers for any questions concerns or change in condition. Patient encourgaed increase in safety awareness. Patient states arm achy under cast [facility] nurse Hospital aware able to move fingers, I encouraged to call ortho asap to update, patient verbalizes understanding. (7/1/2025 10:23 AM)    Engagement  Call Start Time: 1023 (7/1/2025 10:23 AM)    Medications  Medications reviewed with patient/caregiver?: Yes (7/1/2025 10:23 AM)  Is the patient having any side effects they believe may be caused by any medication additions or changes?: No (7/1/2025 10:23 AM)  Does the patient have all medications ordered at discharge?: Yes (7/1/2025 10:23 AM)  Care Management Interventions: No intervention needed (7/1/2025 10:23 AM)  Prescription Comments: [facility] Summary , Ordered Medications, ergocalciferol 50,000 unit capsule (VITAMIN D2, DRISDOL)   Take 1 capsule by mouth two times a week for 28 doses. Patient should start on July 2, 2025.   28 capsule       07/02/2025 09/22/2025  oxyCODONE IR (ROXICODONE) 5 mg immediate release tablet   Indications: Fall, initial encounter Take 1-2 tablets by mouth every 4 hours as needed (severe pain).   20 tablet       06/30/2025 03/25/2028  doxycycline hyclate (VIBRAMYCIN) 100 mg capsule   Take 1 capsule by mouth every 12 hours at 6 am and 6 pm for 19 doses.   19 capsule       06/30/2025 07/10/2025  polyethylene glycol 3350 17 gram packet   Take 1 packet by mouth once daily.  Dissolve dose in 4 - 8 ounces of liquid and take as directed.       07/01/2025    acetaminophen (TYLENOL) 500 mg tablet   Take 2 tablets by mouth every 6 hours as needed (mild or moderate pain).       06/30/2025    oxyCODONE IR (ROXICODONE) 5 mg immediate release tablet   Indications: Fall, initial encounter Take 1-2 tablets by mouth every 4 hours as needed (severe pain).   20 tablet       06/30/2025 06/30/2025  ergocalciferol 50,000 unit capsule (VITAMIN D2, DRISDOL)   Take 1 capsule by mouth two times a week for 28 doses.   28 capsule       07/02/2025 06/30/2025  doxycycline hyclate (VIBRAMYCIN) 100 mg capsule   Take 1 capsule by mouth every 12 hours at 6 am and 6 pm for 19 doses.   19 capsule       06/30/2025 06/30/2025 (7/1/2025 10:23 AM)  Is the patient taking all medications as directed (includes completed medication regime)?: -- (pt states has meds) (7/1/2025 10:23 AM)  Care Management Interventions: Provided patient education (7/1/2025 10:23 AM)  Medication Comments: no questions or concerns at this time (7/1/2025 10:23 AM)    Appointments  Does the patient have a primary care provider?: Yes (7/1/2025 10:23 AM)  Care Management Interventions: Educated patient on importance of making appointment (7/1/2025 10:23 AM)  Care Management Interventions: Advised to schedule with specialist (7/1/2025 10:23 AM)    Self Management  What is the home health agency?: [facility] patient has number to call (7/1/2025 10:23 AM)    Patient Teaching  Does the patient have access to their discharge instructions?: Yes (7/1/2025 10:23 AM)  Care Management Interventions: Reviewed instructions with patient (7/1/2025 10:23 AM)  What is the patient's perception of their health status since discharge?: Improving (7/1/2025 10:23 AM)  Is the patient/caregiver able to teach back the hierarchy of who to call/visit for symptoms/problems? PCP, Specialist, Home Health nurse, Urgent Care, ED, 911: Yes (7/1/2025 10:23 AM)

## 2025-07-10 ENCOUNTER — PATIENT OUTREACH (OUTPATIENT)
Dept: PRIMARY CARE | Facility: CLINIC | Age: 80
End: 2025-07-10
Payer: MEDICARE

## 2025-07-10 NOTE — PROGRESS NOTES
Confirmation of at least 2 patient identifiers.    Completed telephonic follow-up patient approximately 14 days post discharge, no PCP follow up.  Patient had F/U with Ortho  last Monday , will await PCP F/U     Spoke to patient during outreach call.    Patient reports feeling: No change from previous encounter     Patient has questions or concerns about medications: No    Have all prescribed medications been filled? Yes    Patient has necessary resources to manage their care? Yes    Patient has questions or concerns? No    Next care management follow-up approximately within one month.  Care  information provided to patient.      Encouraged to call providers for any questions concerns or change in condition

## 2025-07-17 ENCOUNTER — TELEMEDICINE (OUTPATIENT)
Dept: PRIMARY CARE | Facility: CLINIC | Age: 80
End: 2025-07-17
Payer: MEDICARE

## 2025-07-17 VITALS — WEIGHT: 133 LBS | HEIGHT: 63 IN | BODY MASS INDEX: 23.57 KG/M2

## 2025-07-17 DIAGNOSIS — R53.83 FATIGUE, UNSPECIFIED TYPE: ICD-10-CM

## 2025-07-17 DIAGNOSIS — R60.0 LOCALIZED EDEMA: Primary | ICD-10-CM

## 2025-07-17 PROCEDURE — 99213 OFFICE O/P EST LOW 20 MIN: CPT | Performed by: FAMILY MEDICINE

## 2025-07-17 PROCEDURE — G2211 COMPLEX E/M VISIT ADD ON: HCPCS | Performed by: FAMILY MEDICINE

## 2025-07-17 PROCEDURE — 1159F MED LIST DOCD IN RCRD: CPT | Performed by: FAMILY MEDICINE

## 2025-07-17 PROCEDURE — 1160F RVW MEDS BY RX/DR IN RCRD: CPT | Performed by: FAMILY MEDICINE

## 2025-07-17 PROCEDURE — 1126F AMNT PAIN NOTED NONE PRSNT: CPT | Performed by: FAMILY MEDICINE

## 2025-07-17 ASSESSMENT — PATIENT HEALTH QUESTIONNAIRE - PHQ9
SUM OF ALL RESPONSES TO PHQ9 QUESTIONS 1 AND 2: 0
1. LITTLE INTEREST OR PLEASURE IN DOING THINGS: NOT AT ALL
2. FEELING DOWN, DEPRESSED OR HOPELESS: NOT AT ALL

## 2025-07-17 ASSESSMENT — PAIN SCALES - GENERAL: PAINLEVEL_OUTOF10: 0-NO PAIN

## 2025-07-17 NOTE — PROGRESS NOTES
Subjective   Patient ID: Giovanna Flores is a 80 y.o. female who presents for No chief complaint on file..  This visit was completed via video telephone/virtual visit. All issues as documented below were discussed and addressed but no physical exam was performed. If it was felt that the patient should be evaluated via  face-to-face office appointment(s) they were directed there. The patient verbally consented to this visit.   She had a fall and has a shoulder immobilizer - has a hard time moving around - she states she is unable to come in and be seen and unable to get labs - even though I strongly encourage her to be seen and get labs to be able to assess why she has bilateral lower ext swelling (worse in the morning). She does admit that when she was in the hospital for her fall, the stopped her hydrochlorothiazide, which she has always been on for edema. Agrees to start back on that and will call and let me know if her edema does not get better.    Edema    Presents with new edema. The current episode started less than one week ago. The onset of the episode was gradual. These episodes happen in the morning. The problem presents itself constantly. The problem has been waxing and waning. The edema is present on the both side(s).     Associated symptoms include fatigue.   Pertinent negative symptoms include no abdominal pain, no chest pain, no cough, no fever, no nausea, no palpitations, no presyncope, no syncope and no vomiting.        Review of Systems   Constitutional:  Positive for fatigue. Negative for chills and fever.   Respiratory:  Negative for cough, shortness of breath and wheezing.    Cardiovascular:  Positive for leg swelling. Negative for chest pain, palpitations and syncope.   Gastrointestinal:  Negative for abdominal pain, constipation, nausea and vomiting.   Genitourinary:  Negative for frequency and hematuria.   Musculoskeletal:  Positive for arthralgias and myalgias.   Skin:  Negative for rash.  "  Neurological:  Negative for dizziness and weakness.   Psychiatric/Behavioral:  Negative for confusion. The patient is not nervous/anxious.        Objective   Ht 1.6 m (5' 3\")   Wt 60.3 kg (133 lb)   BMI 23.56 kg/m²     Physical Exam  Constitutional:       General: She is not in acute distress.     Appearance: Normal appearance. She is not ill-appearing.     Skin:     Findings: No rash.     Neurological:      General: No focal deficit present.      Mental Status: She is alert and oriented to person, place, and time.     Psychiatric:         Mood and Affect: Mood normal.         Behavior: Behavior normal.         Assessment/Plan   Problem List Items Addressed This Visit    None  Visit Diagnoses         Codes      Localized edema    -  Primary R60.0    recommend to start back on  her HCTZ - I strongly recommend she get labs and follow up, she will call and let me know       Fatigue, unspecified type     R53.83    encourage her to get labs done asap                "

## 2025-07-20 ASSESSMENT — ENCOUNTER SYMPTOMS
FEVER: 0
WHEEZING: 0
DIZZINESS: 0
HEMATURIA: 0
ABDOMINAL PAIN: 0
SYNCOPE: 0
CHILLS: 0
NERVOUS/ANXIOUS: 0
COUGH: 0
VOMITING: 0
FATIGUE: 1
CONSTIPATION: 0
ARTHRALGIAS: 1
FREQUENCY: 0
SHORTNESS OF BREATH: 0
CONFUSION: 0
WEAKNESS: 0
MYALGIAS: 1
NAUSEA: 0
PALPITATIONS: 0

## 2025-07-21 ENCOUNTER — TELEPHONE (OUTPATIENT)
Dept: PRIMARY CARE | Facility: CLINIC | Age: 80
End: 2025-07-21
Payer: MEDICARE

## 2025-07-21 DIAGNOSIS — E78.2 MIXED HYPERLIPIDEMIA: ICD-10-CM

## 2025-07-21 DIAGNOSIS — R35.1 NOCTURIA: ICD-10-CM

## 2025-07-21 DIAGNOSIS — E55.9 VITAMIN D DEFICIENCY: ICD-10-CM

## 2025-07-21 DIAGNOSIS — R53.83 FATIGUE, UNSPECIFIED TYPE: Primary | ICD-10-CM

## 2025-07-21 NOTE — TELEPHONE ENCOUNTER
Patient calling to let you know that leg/ankle swelling has improved.    Please place lab orders. She has an appointment today to have them done.

## 2025-07-22 ENCOUNTER — TELEPHONE (OUTPATIENT)
Dept: PRIMARY CARE | Facility: CLINIC | Age: 80
End: 2025-07-22
Payer: MEDICARE

## 2025-07-22 ENCOUNTER — RESULTS FOLLOW-UP (OUTPATIENT)
Dept: PRIMARY CARE | Facility: CLINIC | Age: 80
End: 2025-07-22
Payer: MEDICARE

## 2025-07-23 DIAGNOSIS — D64.9 ANEMIA, UNSPECIFIED TYPE: Primary | ICD-10-CM

## 2025-07-25 DIAGNOSIS — D64.9 ANEMIA, UNSPECIFIED TYPE: ICD-10-CM

## 2025-07-25 DIAGNOSIS — E61.1 LOW IRON: Primary | ICD-10-CM

## 2025-07-25 DIAGNOSIS — Z12.11 ENCOUNTER FOR SCREENING FOR MALIGNANT NEOPLASM OF COLON: ICD-10-CM

## 2025-07-25 LAB
25(OH)D3+25(OH)D2 SERPL-MCNC: 98 NG/ML (ref 30–100)
ALBUMIN SERPL-MCNC: 4.5 G/DL (ref 3.6–5.1)
ALP SERPL-CCNC: 128 U/L (ref 37–153)
ALT SERPL-CCNC: 19 U/L (ref 6–29)
ANION GAP SERPL CALCULATED.4IONS-SCNC: 11 MMOL/L (CALC) (ref 7–17)
APPEARANCE UR: CLEAR
AST SERPL-CCNC: 23 U/L (ref 10–35)
BACTERIA #/AREA URNS HPF: NORMAL /HPF
BACTERIA UR CULT: NORMAL
BASOPHILS # BLD AUTO: 56 CELLS/UL (ref 0–200)
BASOPHILS NFR BLD AUTO: 0.5 %
BILIRUB SERPL-MCNC: 0.4 MG/DL (ref 0.2–1.2)
BILIRUB UR QL STRIP: NEGATIVE
BUN SERPL-MCNC: 24 MG/DL (ref 7–25)
CALCIUM SERPL-MCNC: 9.2 MG/DL (ref 8.6–10.4)
CHLORIDE SERPL-SCNC: 103 MMOL/L (ref 98–110)
CHOLEST SERPL-MCNC: 173 MG/DL
CHOLEST/HDLC SERPL: 2.1 (CALC)
CO2 SERPL-SCNC: 27 MMOL/L (ref 20–32)
COLOR UR: YELLOW
CREAT SERPL-MCNC: 0.68 MG/DL (ref 0.6–1)
EGFRCR SERPLBLD CKD-EPI 2021: 89 ML/MIN/1.73M2
EOSINOPHIL # BLD AUTO: 11 CELLS/UL (ref 15–500)
EOSINOPHIL NFR BLD AUTO: 0.1 %
ERYTHROCYTE [DISTWIDTH] IN BLOOD BY AUTOMATED COUNT: 13.7 % (ref 11–15)
GLUCOSE SERPL-MCNC: 108 MG/DL (ref 65–139)
GLUCOSE UR QL STRIP: NEGATIVE
HCT VFR BLD AUTO: 35.4 % (ref 35–45)
HDLC SERPL-MCNC: 82 MG/DL
HGB BLD-MCNC: 11.1 G/DL (ref 11.7–15.5)
HGB UR QL STRIP: NEGATIVE
HYALINE CASTS #/AREA URNS LPF: NORMAL /LPF
IRON SATN MFR SERPL: 11 % (CALC) (ref 16–45)
IRON SATN MFR SERPL: 12 % (CALC) (ref 16–45)
IRON SERPL-MCNC: 46 MCG/DL (ref 45–160)
IRON SERPL-MCNC: 53 MCG/DL (ref 45–160)
KETONES UR QL STRIP: NEGATIVE
LDLC SERPL CALC-MCNC: 72 MG/DL (CALC)
LEUKOCYTE ESTERASE UR QL STRIP: NEGATIVE
LYMPHOCYTES # BLD AUTO: 1820 CELLS/UL (ref 850–3900)
LYMPHOCYTES NFR BLD AUTO: 16.4 %
MCH RBC QN AUTO: 28.9 PG (ref 27–33)
MCHC RBC AUTO-ENTMCNC: 31.4 G/DL (ref 32–36)
MCV RBC AUTO: 92.2 FL (ref 80–100)
MONOCYTES # BLD AUTO: 744 CELLS/UL (ref 200–950)
MONOCYTES NFR BLD AUTO: 6.7 %
NEUTROPHILS # BLD AUTO: 8469 CELLS/UL (ref 1500–7800)
NEUTROPHILS NFR BLD AUTO: 76.3 %
NITRITE UR QL STRIP: NEGATIVE
NONHDLC SERPL-MCNC: 91 MG/DL (CALC)
PH UR STRIP: 5.5 [PH] (ref 5–8)
PLATELET # BLD AUTO: 542 THOUSAND/UL (ref 140–400)
PMV BLD REES-ECKER: 10.1 FL (ref 7.5–12.5)
POTASSIUM SERPL-SCNC: 4 MMOL/L (ref 3.5–5.3)
PROT SERPL-MCNC: 6.8 G/DL (ref 6.1–8.1)
PROT UR QL STRIP: NEGATIVE
RBC # BLD AUTO: 3.84 MILLION/UL (ref 3.8–5.1)
RBC #/AREA URNS HPF: NORMAL /HPF
SERVICE CMNT-IMP: NORMAL
SODIUM SERPL-SCNC: 141 MMOL/L (ref 135–146)
SP GR UR STRIP: 1.02 (ref 1–1.03)
SQUAMOUS #/AREA URNS HPF: NORMAL /HPF
TIBC SERPL-MCNC: 405 MCG/DL (CALC) (ref 250–450)
TIBC SERPL-MCNC: 429 MCG/DL (CALC) (ref 250–450)
TRIGL SERPL-MCNC: 109 MG/DL
TSH SERPL-ACNC: 1.08 MIU/L (ref 0.4–4.5)
VIT B12 SERPL-MCNC: 1142 PG/ML (ref 200–1100)
WBC # BLD AUTO: 11.1 THOUSAND/UL (ref 3.8–10.8)
WBC #/AREA URNS HPF: NORMAL /HPF

## 2025-07-29 ENCOUNTER — PATIENT OUTREACH (OUTPATIENT)
Dept: PRIMARY CARE | Facility: CLINIC | Age: 80
End: 2025-07-29
Payer: MEDICARE

## 2025-07-29 NOTE — PROGRESS NOTES
Unable to reach patient for follow up call     Left voicemail with call back number for patient to call if needed   If no voicemail available call attempts x 2 were made to contact the patient to assist with any questions or concerns patient may have.     L/M Encouraged to call provider for any questions concerns or change in condition

## 2025-10-13 ENCOUNTER — APPOINTMENT (OUTPATIENT)
Dept: RADIOLOGY | Facility: HOSPITAL | Age: 80
End: 2025-10-13
Payer: MEDICARE